# Patient Record
Sex: MALE | Race: WHITE | NOT HISPANIC OR LATINO | Employment: FULL TIME | URBAN - METROPOLITAN AREA
[De-identification: names, ages, dates, MRNs, and addresses within clinical notes are randomized per-mention and may not be internally consistent; named-entity substitution may affect disease eponyms.]

---

## 2017-01-18 ENCOUNTER — GENERIC CONVERSION - ENCOUNTER (OUTPATIENT)
Dept: OTHER | Facility: OTHER | Age: 47
End: 2017-01-18

## 2017-02-05 ENCOUNTER — APPOINTMENT (EMERGENCY)
Dept: RADIOLOGY | Facility: HOSPITAL | Age: 47
End: 2017-02-05
Payer: COMMERCIAL

## 2017-02-05 ENCOUNTER — HOSPITAL ENCOUNTER (EMERGENCY)
Facility: HOSPITAL | Age: 47
Discharge: HOME | End: 2017-02-05
Admitting: EMERGENCY MEDICINE
Payer: COMMERCIAL

## 2017-02-05 VITALS
RESPIRATION RATE: 16 BRPM | DIASTOLIC BLOOD PRESSURE: 96 MMHG | HEIGHT: 68 IN | OXYGEN SATURATION: 100 % | WEIGHT: 210 LBS | HEART RATE: 66 BPM | TEMPERATURE: 97.6 F | BODY MASS INDEX: 31.83 KG/M2 | SYSTOLIC BLOOD PRESSURE: 186 MMHG

## 2017-02-05 DIAGNOSIS — M79.604 PAIN OF RIGHT LOWER EXTREMITY: Primary | ICD-10-CM

## 2017-02-05 PROCEDURE — 73560 X-RAY EXAM OF KNEE 1 OR 2: CPT

## 2017-02-05 PROCEDURE — 99284 EMERGENCY DEPT VISIT MOD MDM: CPT

## 2017-02-05 PROCEDURE — 73590 X-RAY EXAM OF LOWER LEG: CPT

## 2017-03-29 ENCOUNTER — APPOINTMENT (OUTPATIENT)
Dept: LAB | Facility: HOSPITAL | Age: 47
End: 2017-03-29
Payer: COMMERCIAL

## 2017-03-29 ENCOUNTER — ALLSCRIPTS OFFICE VISIT (OUTPATIENT)
Dept: OTHER | Facility: OTHER | Age: 47
End: 2017-03-29

## 2017-03-29 DIAGNOSIS — R35.0 FREQUENCY OF MICTURITION: ICD-10-CM

## 2017-03-29 LAB
BILIRUB UR QL STRIP: NORMAL
GLUCOSE (HISTORICAL): NORMAL
HGB UR QL STRIP.AUTO: NORMAL
KETONES UR STRIP-MCNC: NORMAL MG/DL
LEUKOCYTE ESTERASE UR QL STRIP: NORMAL
NITRITE UR QL STRIP: NORMAL
PH UR STRIP.AUTO: 6.5 [PH]
PROT UR STRIP-MCNC: NORMAL MG/DL
SP GR UR STRIP.AUTO: 1.02
UROBILINOGEN UR QL STRIP.AUTO: 1

## 2017-03-29 PROCEDURE — 87086 URINE CULTURE/COLONY COUNT: CPT

## 2017-03-30 ENCOUNTER — GENERIC CONVERSION - ENCOUNTER (OUTPATIENT)
Dept: OTHER | Facility: OTHER | Age: 47
End: 2017-03-30

## 2017-03-30 LAB — BACTERIA UR CULT: NORMAL

## 2018-01-12 VITALS
TEMPERATURE: 97.8 F | WEIGHT: 215.2 LBS | DIASTOLIC BLOOD PRESSURE: 82 MMHG | OXYGEN SATURATION: 99 % | BODY MASS INDEX: 31.87 KG/M2 | HEART RATE: 66 BPM | SYSTOLIC BLOOD PRESSURE: 126 MMHG | HEIGHT: 69 IN

## 2018-01-16 NOTE — RESULT NOTES
Message   Urine culture not showing infection  Are his symptoms improving?  If not would refer to Urology     Verified Results  (1) URINE CULTURE [id number] 06:19PM Elza Rom Order Number: [id number]     Test Name Result Flag Reference   CLINICAL REPORT (Report)     Test:        Urine culture  Specimen Type:   Urine  Specimen Date:   3/29/2017 6:19 PM  Result Date:    3/30/2017 5:18 PM  Result Status:   Final result  Resulting Lab:   [facility] [address] [place] [address]            Tel: [phone]      CULTURE                                       ------------------                                   [id number] cfu/ml Mixed Contaminants X2

## 2018-01-16 NOTE — RESULT NOTES
Message   He has severe osteoarthritis of the hip  Other Xrays are normal  Should see ortho regarding his hip     Verified Results  * XR HIP/PELV 2-3 VWS LEFT W PELVIS IF PERFORMED 21Nov2016 04:16PM Tatyana Meir Order Number: [id number]     Test Name Result Flag Reference   * XR HIP/PELV 2-3 VWS LEFT (Report)     LEFT HIP     INDICATION: Left hip pain  COMPARISON: None     VIEWS: AP pelvis and 2 coned down views; 3 images     FINDINGS:     There is no fracture or dislocation  There is severe degenerative narrowing superiorly in the left hip joint, with associated increased subchondral sclerosis and prominent marginal osteophytes  Mild degenerative changes are noted in the right hip  No lytic or blastic lesions are seen  Soft tissues are unremarkable  IMPRESSION:     Severe left hip osteoarthritis  Workstation performed: [id number]     Signed by:   Vicky Mcneil MD   11/23/16     * XR FINGER LEFT FIFTH DIGIT-PINKIE [id number] 04:16PM Tatyana Meir Order Number: [id number]     Test Name Result Flag Reference   XR FINGER LEFT FIFTH DIGIT-PINKIE (Report)     LEFT FINGER     INDICATION: Left 5th finger pain  COMPARISON: None     VIEWS: PA view hand and 2 cone-down views of the 5th digit; 3 images     For the purposes of institution wide universal language the following terms will apply: (thumb=1st digit/finger, index finger=2nd digit/finger, long finger=3rd digit/finger, ring=4th digit/finger and small finger=5th digit/finger)     FINDINGS:     There is no acute fracture or dislocation  No significant degenerative changes  No lytic or blastic lesion  Soft tissues are unremarkable  IMPRESSION:     No fracture         Workstation performed: [id number]     Signed by:   Vicky Mcneil MD   11/23/16     * XR WRIST 3+ VIEW LEFT [id number] 04:16PM Tatayna Meir Order Number: [id number]     Test Name Result Flag Reference   XR WRIST 3+ VW LEFT (Report) LEFT WRIST     INDICATION: Left wrist pain  COMPARISON: None     VIEWS: 3; 3 images     FINDINGS:     There is no acute fracture or dislocation  No degenerative changes  No lytic or blastic lesions are seen  Soft tissues are unremarkable  IMPRESSION:     No acute osseous abnormality         Workstation performed: [id number]     Signed by:   Masha Gordon MD   11/23/16

## 2018-01-17 NOTE — MISCELLANEOUS
Message   Recorded as Task   Date: 01/16/2017 01:36 PM, Created By: Florence Sanchez   Task Name: Call Back   Assigned To: Alejandra Lim   Regarding Patient: Thanh Saez, Status: In Progress   Comment:    Alejandra Lim - 16 Jan 2017 1:36 PM     TASK CREATED  Please call find out who did he see for Ortho ? Just checking on him  I have not gotten any reports  Remy Lewis - 16 Jan 2017 2:48 PM     TASK REPLIED TO: Previously Assigned To [facility],Team  lmomtcRemy Granda - 16 Jan 2017 2:48 PM     TASK IN PROGRESS   Remy Lewis - 17 Jan 2017 11:33 AM     TASK REPLIED TO: Previously Assigned To [facility],Team  [email or web address]   Cristal Larsen - 18 Jan 2017 9:58 AM     TASK REPLIED TO: Previously Assigned To Odalys Childs                      spoke with pt he said he went to [place] on [address] not sure where or who he stated it was not a good day he got very mad with them and left he said he didn't understand what they were talking about  He said he would like for you to give him a call  Alejandra Lim - 18 Jan 2017 9:59 AM     TASK REPLIED TO: Previously Assigned To Alejandra Lim  ok can we try to get a report first? Maybe [facility]? Cristal Larsen - 18 Jan 2017 11:00 AM     TASK REPLIED TO: Previously Assigned To Odalys Childs                      spoke with pt and he was not very nice he told me he didn't want to go over it with me he SAID HE WAS NOT SURE WHERE HE WENT  Lisa Ulrich - 18 Jan 2017 11:10 AM     TASK REPLIED TO: Previously Assigned To Alejandra Lim                      called [facility] and she said he saw Dr Alexandra Nieto on 12/7/16 but when she went to look at the notes there was nothinging in there  she said she is going to put in a message why and will call us or fax us the info  Also see my previous note  Spoke to patient - he states he did see ortho & he was told to just modify his activities and he has done this   He has done less high impact activities and his pain is actually much better  He is not having much pain in the hip or leg  He does not take any medications  I advised him to call me back if pain returns        Signatures   Electronically signed by : Paulino Robles MD; Jan 18 2017 12:51PM EST                       (Author)

## 2018-01-23 NOTE — PROGRESS NOTES
Assessment    1  Left wrist pain (719 43) (M25 532)   2  Pain of finger of left hand (729 5) (M94 312)   3  Hip pain, left (719 45) (M25 372)    Plan  Health Maintenance    · Stop: Fluzone Quadrivalent 0 5 ML Intramuscular Suspension Prefilled  Syringe  Hip pain, left    · * XR HIP/PELV 2-3 VWS LEFT W PELVIS IF PERFORMED; Status:Active; Requested  for:16Nov2016;   Left wrist pain    · * XR WRIST 3+ VIEW LEFT; Status:Active; Requested for:16Nov2016;   Pain of finger of left hand    · * XR FINGER LEFT FIFTH DIGIT-PINKIE; Status:Active; Requested for:16Nov2016;     Discussion/Summary    Will Xray wrist, finger, and hip  Advised Aleve which he will get OTC  May need ortho  Possible side effects of new medications were reviewed with the patient/guardian today  The treatment plan was reviewed with the patient/guardian  The patient/guardian understands and agrees with the treatment plan      Chief Complaint  Patient is here for left pinky pain, left wrist pain and left thigh pain  History of Present Illness  48year old here for L 5th digit pain, also developed a lump around the knuckle  Pain started about 2 months ago  No current treatment  He is very active in martial arts and gripping makes it worse  He also has L wrist pain for about a week - radial deviation causes pain  No current treatment  He also complains of pain on the L upper leg for the past year  Feels heavy at times  He describes the pain as achy  He has pain with walking and he seems to be limping  No numbness or tingling  Review of Systems    Constitutional: No fever or chills, feels well, no tiredness, no recent weight gain or weight loss  Cardiovascular: No complaints of slow heart rate, no fast heart rate, no chest pain, no palpitations, no leg claudication, no lower extremity  Respiratory: No complaints of shortness of breath, no wheezing, no cough, no SOB on exertion, no orthopnea or PND  Musculoskeletal: as noted in HPI  Integumentary: No complaints of skin rash or skin lesions, no itching, no skin wound, no dry skin  Neurological: No compliants of headache, no confusion, no convulsions, no numbness or tingling, no dizziness or fainting, no limb weakness, no difficulty walking  Active Problems    1  Lipid screening (V77 91) (Z13 220)   2  Screening PSA (prostate specific antigen) (V76 44) (Z12 5)    Past Medical History    1  Negative depression screening    The active problems and past medical history were reviewed and updated today  Family History  Mother    1  Family history of malignant neoplasm of breast (V16 3) (Z80 3)  Father    2  Family history of Chronic atrial fibrillation    The family history was reviewed and updated today  Social History    · Never a smoker  The social history was reviewed and updated today  Current Meds   1  No Reported Medications Recorded    The medication list was reviewed and updated today  Allergies    1  Ampicillin CAPS    Vitals  Vital Signs    Recorded: 25KFK5413 79:12RY   Systolic 145   Diastolic 82   Heart Rate 76   Temperature 97 4 F   O2 Saturation 97   Height 5 ft 9 in   Weight 223 lb 12 8 oz   BMI Calculated 33 05   BSA Calculated 2 16     Physical Exam    Constitutional   General appearance: No acute distress, well appearing and well nourished  Pulmonary   Respiratory effort: No increased work of breathing or signs of respiratory distress  Musculoskeletal   Gait and station: Normal       Left Hip: Tenderness: None  Flexion: painful restricted AROM  External rotation: painful restricted AROM  Abduction: painful restricted AROM  Special Tests: positive GUILLERMO test    Left Fifth Digit/Hand: Appearance: swelling of the 5th DIP joint  Tenderness: 5th DIP joint  Left Wrist: Appearance: Normal  Tenderness: radial styloid  ROM: Full        Results/Data  PHQ-9 Adult Depression Screening 88IWT5885 11:13AM User, Ahs     Test Name Result Flag Reference   PHQ-9 Adult Depression Score 5     Over the last two weeks, how often have you been bothered by any of the following problems? Little interest or pleasure in doing things: Not at all - 0  Feeling down, depressed, or hopeless: Not at all - 0  Trouble falling or staying asleep, or sleeping too much: Nearly every day - 3  Feeling tired or having little energy: More than half the days - 2  Poor appetite or over eating: Not at all - 0  Feeling bad about yourself - or that you are a failure or have let yourself or your family down: Not at all - 0  Trouble concentrating on things, such as reading the newspaper or watching television: Not at all - 0  Moving or speaking so slowly that other people could have noticed   Or the opposite -  being so fidgety or restless that you have been moving around a lot more than usual: Not at all - 0  Thoughts that you would be better off dead, or of hurting yourself in some way: Not at all - 0   PHQ-9 Adult Depression Screening Negative     PHQ-9 Difficulty Level Not difficult at all     PHQ-9 Severity Mild Depression         Signatures   Electronically signed by : Catarino Snell MD; Nov 16 2016 12:12PM EST                       (Author)

## 2018-02-08 ENCOUNTER — OFFICE VISIT (OUTPATIENT)
Dept: FAMILY MEDICINE CLINIC | Facility: CLINIC | Age: 48
End: 2018-02-08
Payer: COMMERCIAL

## 2018-02-08 VITALS
TEMPERATURE: 97.6 F | WEIGHT: 190 LBS | HEART RATE: 65 BPM | SYSTOLIC BLOOD PRESSURE: 120 MMHG | RESPIRATION RATE: 18 BRPM | BODY MASS INDEX: 28.14 KG/M2 | OXYGEN SATURATION: 98 % | HEIGHT: 69 IN | DIASTOLIC BLOOD PRESSURE: 76 MMHG

## 2018-02-08 DIAGNOSIS — Z12.5 PROSTATE CANCER SCREENING: ICD-10-CM

## 2018-02-08 DIAGNOSIS — R53.83 FATIGUE, UNSPECIFIED TYPE: ICD-10-CM

## 2018-02-08 DIAGNOSIS — Z13.220 LIPID SCREENING: ICD-10-CM

## 2018-02-08 DIAGNOSIS — R39.9 UTI SYMPTOMS: Primary | ICD-10-CM

## 2018-02-08 DIAGNOSIS — R33.9 URINARY RETENTION: ICD-10-CM

## 2018-02-08 LAB
SL AMB  POCT GLUCOSE, UA: NEGATIVE
SL AMB LEUKOCYTE ESTERASE,UA: NEGATIVE
SL AMB POCT BILIRUBIN,UA: NEGATIVE
SL AMB POCT BLOOD,UA: NEGATIVE
SL AMB POCT CLARITY,UA: CLEAR
SL AMB POCT COLOR,UA: YELLOW
SL AMB POCT KETONES,UA: NEGATIVE
SL AMB POCT NITRITE,UA: NEGATIVE
SL AMB POCT PH,UA: 5.5
SL AMB POCT SPECIFIC GRAVITY,UA: 1.01

## 2018-02-08 PROCEDURE — 99214 OFFICE O/P EST MOD 30 MIN: CPT | Performed by: NURSE PRACTITIONER

## 2018-02-08 PROCEDURE — 81002 URINALYSIS NONAUTO W/O SCOPE: CPT | Performed by: NURSE PRACTITIONER

## 2018-02-08 NOTE — PROGRESS NOTES
Assessment/Plan:    No problem-specific Assessment & Plan notes found for this encounter  Diagnoses and all orders for this visit:    UTI symptoms  -     POCT urine dip  -     Comprehensive metabolic panel; Future  -     PSA Total, Diagnostic; Future  -     Testosterone, free, total; Future  -     Lipid Panel with Direct LDL reflex; Future  -     Vitamin D 25 hydroxy; Future    Urinary retention  -     Comprehensive metabolic panel; Future  -     PSA Total, Diagnostic; Future  -     Testosterone, free, total; Future  -     Lipid Panel with Direct LDL reflex; Future  -     Vitamin D 25 hydroxy; Future    Fatigue, unspecified type  -     Comprehensive metabolic panel; Future  -     PSA Total, Diagnostic; Future  -     Testosterone, free, total; Future  -     Lipid Panel with Direct LDL reflex; Future  -     Vitamin D 25 hydroxy; Future    Lipid screening  -     Comprehensive metabolic panel; Future  -     PSA Total, Diagnostic; Future  -     Testosterone, free, total; Future  -     Lipid Panel with Direct LDL reflex; Future  -     Vitamin D 25 hydroxy; Future    Prostate cancer screening  -     Comprehensive metabolic panel; Future  -     PSA Total, Diagnostic; Future  -     Testosterone, free, total; Future  -     Lipid Panel with Direct LDL reflex; Future  -     Vitamin D 25 hydroxy; Future          Vitals:    02/08/18 1316   BP: 120/76   Pulse: 65   Resp: 18   Temp: 97 6 °F (36 4 °C)   SpO2: 98%       Subjective:      Patient ID: Silva Martino is a 50 y o  male  Pt started with symptoms of urinary burning a few weeks ago  Symptoms improved but then got worse  He feels like he is not emptying  Feels some leaking  No blood   No fever or back pain  No pressure on lower abdomen  Started supplement of Creatine a few weeks ago  Pt would also like routine labs and screening tests done           The following portions of the patient's history were reviewed and updated as appropriate:   He  has no past medical history on file  He  does not have any pertinent problems on file  He  has a past surgical history that includes Tonsillectomy  His family history includes Atrial fibrillation in his father; Breast cancer in his mother; Stomach cancer in his paternal uncle  He  reports that he has never smoked  He has never used smokeless tobacco  He reports that he drinks alcohol  He reports that he does not use drugs  No current outpatient prescriptions on file  No current facility-administered medications for this visit  No current outpatient prescriptions on file prior to visit  No current facility-administered medications on file prior to visit  He is allergic to ampicillin       Review of Systems   Constitutional: Negative for fatigue and fever  HENT: Negative  Respiratory: Negative  Gastrointestinal: Negative for abdominal pain  Endocrine: Negative for cold intolerance and heat intolerance  Genitourinary: Positive for difficulty urinating, frequency and urgency  Negative for decreased urine volume, flank pain and hematuria  Musculoskeletal: Negative for back pain  Skin: Negative  Allergic/Immunologic: Negative for immunocompromised state  Neurological: Negative for dizziness  Hematological: Negative for adenopathy  All other systems reviewed and are negative  Objective:     Physical Exam   Constitutional: He is oriented to person, place, and time  He appears well-developed and well-nourished  No distress  HENT:   Head: Normocephalic and atraumatic  Eyes: Conjunctivae and EOM are normal  Pupils are equal, round, and reactive to light  Neck: Normal range of motion  Neck supple  Cardiovascular: Normal rate, regular rhythm and normal heart sounds  Pulmonary/Chest: Effort normal and breath sounds normal  No respiratory distress  Abdominal: Soft  He exhibits no distension     Genitourinary:   Genitourinary Comments: Denies CVA tenderness   Musculoskeletal: Normal range of motion  Neurological: He is alert and oriented to person, place, and time  Skin: Skin is warm and dry  Psychiatric: He has a normal mood and affect  His behavior is normal  Judgment and thought content normal    Nursing note and vitals reviewed

## 2018-02-08 NOTE — PATIENT INSTRUCTIONS
Urine testing is completely within normal limits  If no imprvment of symptoms with interventions that have been discussed, you would need an ultrasound of your kidneys    Urinary symptoms- stop Creatine supplement  Increase water consumption to at least 1 liter a day  Check routine labs-screen for cholesterol, glucose , and kidney fxn  Fatigue- check testosterone, Vitamin d levels    We will call you with lab results

## 2018-02-12 DIAGNOSIS — E55.9 VITAMIN D DEFICIENCY: Primary | ICD-10-CM

## 2018-02-12 RX ORDER — ERGOCALCIFEROL 1.25 MG/1
50000 CAPSULE ORAL WEEKLY
Qty: 4 CAPSULE | Refills: 3 | Status: SHIPPED | OUTPATIENT
Start: 2018-02-12 | End: 2018-04-18

## 2018-02-13 LAB
25(OH)D3+25(OH)D2 SERPL-MCNC: 18.6 NG/ML (ref 30–100)
ALBUMIN SERPL-MCNC: 4.4 G/DL (ref 3.5–5.5)
ALBUMIN/GLOB SERPL: 1.8 {RATIO} (ref 1.2–2.2)
ALP SERPL-CCNC: 47 IU/L (ref 39–117)
ALT SERPL-CCNC: 20 IU/L (ref 0–44)
AST SERPL-CCNC: 18 IU/L (ref 0–40)
BILIRUB SERPL-MCNC: 0.5 MG/DL (ref 0–1.2)
BUN SERPL-MCNC: 18 MG/DL (ref 6–24)
BUN/CREAT SERPL: 17 (ref 9–20)
CALCIUM SERPL-MCNC: 8.8 MG/DL (ref 8.7–10.2)
CHLORIDE SERPL-SCNC: 102 MMOL/L (ref 96–106)
CHOLEST SERPL-MCNC: 158 MG/DL (ref 100–199)
CO2 SERPL-SCNC: 25 MMOL/L (ref 18–29)
CREAT SERPL-MCNC: 1.09 MG/DL (ref 0.76–1.27)
GLOBULIN SER-MCNC: 2.4 G/DL (ref 1.5–4.5)
GLUCOSE SERPL-MCNC: 91 MG/DL (ref 65–99)
HDLC SERPL-MCNC: 54 MG/DL
LDLC SERPL CALC-MCNC: 86 MG/DL (ref 0–99)
LDLC SERPL DIRECT ASSAY-MCNC: 93 MG/DL (ref 0–99)
POTASSIUM SERPL-SCNC: 4.3 MMOL/L (ref 3.5–5.2)
PROT SERPL-MCNC: 6.8 G/DL (ref 6–8.5)
PSA SERPL-MCNC: 0.4 NG/ML (ref 0–4)
SL AMB EGFR AFRICAN AMERICAN: 92 ML/MIN/1.73
SL AMB EGFR NON AFRICAN AMERICAN: 79 ML/MIN/1.73
SL AMB VLDL CHOLESTEROL CALC: 18 MG/DL (ref 5–40)
SODIUM SERPL-SCNC: 141 MMOL/L (ref 134–144)
TESTOST FREE SERPL-MCNC: 9.6 PG/ML (ref 6.8–21.5)
TESTOST SERPL-MCNC: 421 NG/DL (ref 264–916)
TRIGL SERPL-MCNC: 89 MG/DL (ref 0–149)

## 2018-04-18 ENCOUNTER — OFFICE VISIT (OUTPATIENT)
Dept: FAMILY MEDICINE CLINIC | Facility: CLINIC | Age: 48
End: 2018-04-18
Payer: COMMERCIAL

## 2018-04-18 VITALS
HEIGHT: 69 IN | HEART RATE: 60 BPM | BODY MASS INDEX: 29.92 KG/M2 | TEMPERATURE: 97.2 F | DIASTOLIC BLOOD PRESSURE: 74 MMHG | SYSTOLIC BLOOD PRESSURE: 116 MMHG | OXYGEN SATURATION: 98 % | WEIGHT: 202 LBS

## 2018-04-18 DIAGNOSIS — R07.81 RIB PAIN ON RIGHT SIDE: ICD-10-CM

## 2018-04-18 DIAGNOSIS — S39.011A FLANK STRAIN, INITIAL ENCOUNTER: Primary | ICD-10-CM

## 2018-04-18 PROBLEM — R39.9 UTI SYMPTOMS: Status: RESOLVED | Noted: 2018-02-08 | Resolved: 2018-04-18

## 2018-04-18 PROBLEM — R33.9 URINARY RETENTION: Status: RESOLVED | Noted: 2018-02-08 | Resolved: 2018-04-18

## 2018-04-18 PROCEDURE — 1036F TOBACCO NON-USER: CPT | Performed by: FAMILY MEDICINE

## 2018-04-18 PROCEDURE — 99213 OFFICE O/P EST LOW 20 MIN: CPT | Performed by: FAMILY MEDICINE

## 2018-04-18 PROCEDURE — 3008F BODY MASS INDEX DOCD: CPT | Performed by: FAMILY MEDICINE

## 2018-04-18 NOTE — LETTER
April 18, 2018     Patient: Kb Gold   YOB: 1970   Date of Visit: 4/18/2018       To Whom it May Concern:    Tatiana Mock is under my professional care  He was seen in my office on 4/18/2018  He has a strain of the right flank and lower rib cage  Participation in physical activity is at his discretion  If you have any questions or concerns, please don't hesitate to call           Sincerely,          Berenice Benitez MD        CC: No Recipients

## 2018-04-18 NOTE — PROGRESS NOTES
Assessment/Plan:     Diagnoses and all orders for this visit:    Flank strain, initial encounter    Rib pain on right side      Advised NSAIDs, heat, rest- he has Aleve at home  Call if not improving over next 4-6 wks    Subjective:      Patient ID: Parish Long is a 50 y o  male  Here for R sided flank pain started 4 days ago  He has been doing abdominal work outs  He is very active in martial arts  Bending, straining, coughing, sneezing, makes it worse  No known injury  No fever, nausea, vomiting  The following portions of the patient's history were reviewed and updated as appropriate:   He  has no past medical history on file  He   Patient Active Problem List    Diagnosis Date Noted    Flank strain, initial encounter 04/18/2018    Rib pain on right side 04/18/2018    Vitamin D deficiency 02/12/2018    Fatigue 02/08/2018    Lipid screening 02/08/2018    Osteoarthritis of left hip 11/23/2016     He  has a past surgical history that includes Tonsillectomy  His family history includes Atrial fibrillation in his father; Breast cancer in his mother; Stomach cancer in his paternal uncle  He  reports that he has never smoked  He has never used smokeless tobacco  He reports that he drinks alcohol  He reports that he does not use drugs  No current outpatient prescriptions on file  No current facility-administered medications for this visit  Current Outpatient Prescriptions on File Prior to Visit   Medication Sig    [DISCONTINUED] ergocalciferol (VITAMIN D2) 50,000 units Take 1 capsule (50,000 Units total) by mouth once a week     No current facility-administered medications on file prior to visit  He is allergic to ampicillin       Review of Systems   Constitutional: Negative for fever  Gastrointestinal: Negative for abdominal pain, blood in stool, constipation, diarrhea, nausea and vomiting     Musculoskeletal:        R flank pain   All other systems reviewed and are negative  Objective:      /74   Pulse 60   Temp (!) 97 2 °F (36 2 °C)   Ht 5' 9" (1 753 m)   Wt 91 6 kg (202 lb)   SpO2 98%   BMI 29 83 kg/m²          Physical Exam   Abdominal: Soft  He exhibits no distension and no mass  There is no tenderness  There is no rebound and no guarding  Musculoskeletal:        Arms:  Nursing note and vitals reviewed

## 2018-12-18 ENCOUNTER — OFFICE VISIT (OUTPATIENT)
Dept: FAMILY MEDICINE CLINIC | Facility: CLINIC | Age: 48
End: 2018-12-18
Payer: COMMERCIAL

## 2018-12-18 VITALS
HEIGHT: 69 IN | BODY MASS INDEX: 30.07 KG/M2 | WEIGHT: 203 LBS | SYSTOLIC BLOOD PRESSURE: 122 MMHG | HEART RATE: 63 BPM | TEMPERATURE: 97.6 F | DIASTOLIC BLOOD PRESSURE: 82 MMHG | OXYGEN SATURATION: 98 %

## 2018-12-18 DIAGNOSIS — Z13.220 LIPID SCREENING: ICD-10-CM

## 2018-12-18 DIAGNOSIS — J06.9 VIRAL URI: Primary | ICD-10-CM

## 2018-12-18 DIAGNOSIS — R53.83 FATIGUE, UNSPECIFIED TYPE: ICD-10-CM

## 2018-12-18 DIAGNOSIS — E55.9 VITAMIN D DEFICIENCY: ICD-10-CM

## 2018-12-18 DIAGNOSIS — H81.10 BENIGN PAROXYSMAL POSITIONAL VERTIGO, UNSPECIFIED LATERALITY: ICD-10-CM

## 2018-12-18 PROBLEM — R07.81 RIB PAIN ON RIGHT SIDE: Status: RESOLVED | Noted: 2018-04-18 | Resolved: 2018-12-18

## 2018-12-18 PROBLEM — S39.011A: Status: RESOLVED | Noted: 2018-04-18 | Resolved: 2018-12-18

## 2018-12-18 PROCEDURE — 99213 OFFICE O/P EST LOW 20 MIN: CPT | Performed by: FAMILY MEDICINE

## 2018-12-18 PROCEDURE — 3008F BODY MASS INDEX DOCD: CPT | Performed by: FAMILY MEDICINE

## 2018-12-18 PROCEDURE — 1036F TOBACCO NON-USER: CPT | Performed by: FAMILY MEDICINE

## 2018-12-18 NOTE — PROGRESS NOTES
Corry Smalls 1970 male MRN: [id number]    Acute Visit        ASSESSMENT/PLAN  Diagnoses and all orders for this visit:    Viral URI    Benign paroxysmal positional vertigo, unspecified laterality    Vitamin D deficiency  -     Vitamin D 25 hydroxy; Future    Lipid screening  -     Lipid Panel with Direct LDL reflex; Future    Fatigue, unspecified type  -     CBC and differential; Future  -     Comprehensive metabolic panel; Future          This is a viral upper respiratory infection  The expected course is 7-10 days  We reviewed OTC medications that are recommended to treat the symptoms  Tylenol or Motrin can be used as needed for pain or fevers  For Vertigo, declined Rx for meclizine  Discussed typically BPPV is self limited  He can try OTC dramamine  FU if not better w/in a week    Update labs  No future appointments  SUBJECTIVE  CC: Dizziness (Patient seen in office today for a c/o feeling dizzy and nauseous - when he turns his head side to side he gets dizzy)       He has congestion, dry throat for few days and today woke up dizzy - felt room spinning and had nausea  Taking Vitamin C  His Vit D was low last year  He is not taking Vit D anymore, was on it but stopped  He would like to get his labs updated  He does report fatigue      Fiona Rosario is a 46 y o  male who presented for an acute visit complaining of  Review of Systems   Constitutional: Positive for fatigue  Negative for activity change, appetite change, chills and fever  HENT: Positive for congestion and sore throat  Negative for ear pain  Eyes: Negative  Respiratory: Positive for cough  Gastrointestinal: Negative  Neurological: Positive for dizziness  Historical Information   The patient history was reviewed as follows:  No past medical history on file    Past Surgical History:   Procedure Laterality Date    TONSILLECTOMY       Family History   Problem Relation Age of Onset   Clifm Alex Breast cancer Mother  Atrial fibrillation Father     Stomach cancer Paternal Uncle       Social History   History   Alcohol Use    Yes     Comment: rare, social       History   Drug Use No     History   Smoking Status    Never Smoker   Smokeless Tobacco    Never Used       Medications:   Meds/Allergies   No current outpatient prescriptions on file  No current facility-administered medications for this visit  Allergies   Allergen Reactions    Ampicillin      nausea       OBJECTIVE  Vitals:   Vitals:    12/18/18 1353   BP: 122/82   Pulse: 63   Temp: 97 6 °F (36 4 °C)   SpO2: 98%   Weight: 92 1 kg (203 lb)   Height: 5' 9" (1 753 m)       Invasive Devices          No matching active lines, drains, or airways          Physical Exam   Constitutional: He is oriented to person, place, and time  He appears well-developed and well-nourished  No distress  HENT:   Right Ear: Hearing, tympanic membrane, external ear and ear canal normal    Left Ear: Hearing, tympanic membrane, external ear and ear canal normal    Nose: Nose normal    Mouth/Throat: Uvula is midline  No oropharyngeal exudate or posterior oropharyngeal erythema  Eyes: Pupils are equal, round, and reactive to light  Conjunctivae are normal    Cardiovascular: Normal rate and normal heart sounds  Exam reveals no gallop and no friction rub  No murmur heard  Pulmonary/Chest: Effort normal and breath sounds normal  No respiratory distress  He has no wheezes  He has no rales  Lymphadenopathy:     He has no cervical adenopathy  Neurological: He is alert and oriented to person, place, and time  Skin: Skin is warm  No rash noted  Psychiatric: He has a normal mood and affect  His behavior is normal  Judgment and thought content normal    Nursing note and vitals reviewed  Lab:  I have personally reviewed all pertinent results

## 2019-05-14 NOTE — PATIENT INSTRUCTIONS
Benign Paroxysmal Positional Vertigo   AMBULATORY CARE:   Benign paroxysmal positional vertigo (BPPV)  is an inner ear condition that causes you to suddenly feel dizzy  Benign means it is not serious or life-threatening  BPPV is caused by a problem with the nerves and structure of your inner ear  BPPV happens when small pieces of calcium break loose and lump together in one of your inner ear canals  Common symptoms include the following: You may feel that you or the room is moving or spinning  Turning your head, rolling over in bed, getting up or lying down may lead to sudden vertigo  You may also have any of the following symptoms:  · Nystagmus (quick shaky eye movement that you cannot control)    · Nausea    · Poor balance and feeling unsteady when you walk  Seek care immediately if:   · You fall during a BPPV episode and are injured  · You have a severe headache that does not go away  · You have new changes in your vision or feel weak or confused  · You have problems hearing, or you have ringing or buzzing in your ears  Contact your healthcare provider if:   · Your BPPV symptoms do not go away or they return  · You have problems with your balance, or you are falling often  · You have new or increased nausea or vomiting with vertigo  · You feel anxious or depressed and do not want to leave your home  · You have questions or concerns about your condition or care  Management of BPPV:   · Your healthcare provider will teach you how to move your head and body to prevent symptoms  For example, he or she may teach you certain ways to move your head or body  These movements usually help relieve your symptoms and keep the dizziness from returning  The exercises help move the calcium pieces to a different part of your ear  Do the movements only as directed  · Vestibular and balance rehabilitation therapy (VBRT)  is used to teach you exercises to improve your balance and strength   VBRT Staff Handoff  Bedside report per SILVESTRE Galvan. No distress noted. Room air. Awake, alert, oriented. Sitter at bedside. In chair, wheels locked. Call bell in reach. Encouraged to call for assistance.     Resident Handoff     may help decrease your dizziness and prevent injuries if you are at risk for falls  · Medicines  may be recommended or prescribed to treat dizziness or nausea  Prevent your symptoms:   · Try to avoid sudden head movements  Stand up and lie down slowly  · Raise and support your head when you lie down  Place pillows under your upper back and head or rest in a recliner  · Change your position often when you are lying down  Try not to lie with your head on the same side for long periods of time  Roll over slowly  · Wear protective gear  when you ride a bike or play sports  A helmet helps protect your head from injury  Follow up with your healthcare provider as directed: You may need to return in 1 month to check the progress of your treatment  Write down your questions so you remember to ask them during your visits  © 2017 [employer] Information is for End User's use only and may not be sold, redistributed or otherwise used for commercial purposes  All illustrations and images included in CareNotes® are the copyrighted property of [employer]  or David Amezcua  The above information is an  only  It is not intended as medical advice for individual conditions or treatments  Talk to your doctor, nurse or pharmacist before following any medical regimen to see if it is safe and effective for you

## 2019-06-05 ENCOUNTER — OFFICE VISIT (OUTPATIENT)
Dept: FAMILY MEDICINE CLINIC | Facility: CLINIC | Age: 49
End: 2019-06-05
Payer: COMMERCIAL

## 2019-06-05 ENCOUNTER — APPOINTMENT (OUTPATIENT)
Dept: RADIOLOGY | Facility: CLINIC | Age: 49
End: 2019-06-05
Payer: COMMERCIAL

## 2019-06-05 VITALS
DIASTOLIC BLOOD PRESSURE: 76 MMHG | TEMPERATURE: 97.6 F | BODY MASS INDEX: 30.48 KG/M2 | OXYGEN SATURATION: 98 % | SYSTOLIC BLOOD PRESSURE: 130 MMHG | HEIGHT: 69 IN | RESPIRATION RATE: 18 BRPM | WEIGHT: 205.8 LBS | HEART RATE: 60 BPM

## 2019-06-05 DIAGNOSIS — M25.50 ARTHRALGIA, UNSPECIFIED JOINT: ICD-10-CM

## 2019-06-05 DIAGNOSIS — R20.2 NUMBNESS AND TINGLING OF RIGHT ARM: ICD-10-CM

## 2019-06-05 DIAGNOSIS — R20.0 NUMBNESS AND TINGLING OF RIGHT ARM: ICD-10-CM

## 2019-06-05 DIAGNOSIS — M16.12 PRIMARY OSTEOARTHRITIS OF LEFT HIP: ICD-10-CM

## 2019-06-05 DIAGNOSIS — Z13.220 LIPID SCREENING: ICD-10-CM

## 2019-06-05 DIAGNOSIS — M25.532 LEFT WRIST PAIN: ICD-10-CM

## 2019-06-05 DIAGNOSIS — Z12.11 COLON CANCER SCREENING: ICD-10-CM

## 2019-06-05 DIAGNOSIS — Z12.5 PROSTATE CANCER SCREENING: ICD-10-CM

## 2019-06-05 DIAGNOSIS — E55.9 VITAMIN D DEFICIENCY: ICD-10-CM

## 2019-06-05 DIAGNOSIS — Z13.1 SCREENING FOR DIABETES MELLITUS: ICD-10-CM

## 2019-06-05 DIAGNOSIS — R53.83 FATIGUE, UNSPECIFIED TYPE: Primary | ICD-10-CM

## 2019-06-05 PROCEDURE — 73502 X-RAY EXAM HIP UNI 2-3 VIEWS: CPT

## 2019-06-05 PROCEDURE — 99214 OFFICE O/P EST MOD 30 MIN: CPT | Performed by: FAMILY MEDICINE

## 2019-06-05 PROCEDURE — 73110 X-RAY EXAM OF WRIST: CPT

## 2019-06-05 PROCEDURE — 3008F BODY MASS INDEX DOCD: CPT | Performed by: FAMILY MEDICINE

## 2019-06-05 RX ORDER — IBUPROFEN 800 MG/1
800 TABLET ORAL
Qty: 30 TABLET | Refills: 1 | Status: SHIPPED | OUTPATIENT
Start: 2019-06-05 | End: 2021-02-04

## 2019-06-24 ENCOUNTER — TELEPHONE (OUTPATIENT)
Dept: GASTROENTEROLOGY | Facility: CLINIC | Age: 49
End: 2019-06-24

## 2021-01-31 DIAGNOSIS — Z20.822 EXPOSURE TO COVID-19 VIRUS: Primary | ICD-10-CM

## 2021-01-31 DIAGNOSIS — R09.81 CONGESTION OF NASAL SINUS: ICD-10-CM

## 2021-02-03 DIAGNOSIS — R09.81 CONGESTION OF NASAL SINUS: ICD-10-CM

## 2021-02-03 DIAGNOSIS — Z20.822 EXPOSURE TO COVID-19 VIRUS: ICD-10-CM

## 2021-02-03 PROCEDURE — U0003 INFECTIOUS AGENT DETECTION BY NUCLEIC ACID (DNA OR RNA); SEVERE ACUTE RESPIRATORY SYNDROME CORONAVIRUS 2 (SARS-COV-2) (CORONAVIRUS DISEASE [COVID-19]), AMPLIFIED PROBE TECHNIQUE, MAKING USE OF HIGH THROUGHPUT TECHNOLOGIES AS DESCRIBED BY CMS-2020-01-R: HCPCS | Performed by: FAMILY MEDICINE

## 2021-02-03 PROCEDURE — U0005 INFEC AGEN DETEC AMPLI PROBE: HCPCS | Performed by: FAMILY MEDICINE

## 2021-02-04 ENCOUNTER — TELEMEDICINE (OUTPATIENT)
Dept: FAMILY MEDICINE CLINIC | Facility: CLINIC | Age: 51
End: 2021-02-04
Payer: COMMERCIAL

## 2021-02-04 VITALS — OXYGEN SATURATION: 98 %

## 2021-02-04 DIAGNOSIS — U07.1 COVID-19: Primary | ICD-10-CM

## 2021-02-04 PROBLEM — M25.532 LEFT WRIST PAIN: Status: RESOLVED | Noted: 2019-06-05 | Resolved: 2021-02-04

## 2021-02-04 PROBLEM — J06.9 VIRAL URI: Status: RESOLVED | Noted: 2018-12-18 | Resolved: 2021-02-04

## 2021-02-04 LAB — SARS-COV-2 RNA RESP QL NAA+PROBE: POSITIVE

## 2021-02-04 PROCEDURE — 99213 OFFICE O/P EST LOW 20 MIN: CPT | Performed by: FAMILY MEDICINE

## 2021-02-04 NOTE — PROGRESS NOTES
COVID-19 Virtual Visit     Assessment/Plan:    Problem List Items Addressed This Visit        Other    COVID-19 - Primary         Disposition:     I recommended continued isolation until at least 24 hours have passed since recovery defined as resolution of fever without the use of fever-reducing medications AND improvement in COVID symptoms AND 10 days have passed since onset of symptoms (or 10 days have passed since date of first positive viral diagnostic test for asymptomatic patients)  Isolation period is over today   Return to work note     I have spent 10 minutes directly with the patient  Encounter provider Shekhar Dawkins MD    Provider located at [address]  [address] [place] [id number]    Recent Visits  No visits were found meeting these conditions  Showing recent visits within past 7 days and meeting all other requirements     Today's Visits  Date Type Provider Dept   02/04/21 Amie Gutierrez MD [facility]   Showing today's visits and meeting all other requirements     Future Appointments  No visits were found meeting these conditions  Showing future appointments within next 150 days and meeting all other requirements        Patient agrees to participate in a virtual check in via telephone or video visit instead of presenting to the office to address urgent/immediate medical needs  Patient is aware this is a billable service  After connecting through Telephone, the patient was identified by name and date of birth  Henrique Sarmiento was informed that this was a telemedicine visit and that the exam was being conducted confidentially over secure lines  My office door was closed  No one else was in the room  Jose David Smalls acknowledged consent and understanding of privacy and security of the telemedicine visit   I informed the patient that I have reviewed his record in Epic and presented the opportunity for him to ask any questions regarding the visit today  The patient agreed to participate  It was my intent to perform this visit via video technology but the patient was not able to do a video connection so the visit was completed via audio telephone only  Subjective:   Jensen Echevarria is a 44 y o  male who has been screened for COVID-19  Symptom change since last report: unchanged  Patient's symptoms include nasal congestion, rhinorrhea and cough  Patient denies fever, chills, fatigue, malaise, sore throat, anosmia, loss of taste, shortness of breath, chest tightness, abdominal pain, nausea, vomiting, diarrhea, myalgias and headaches  Dillon Apple has been staying home and has isolated themselves in his home  He is taking care to not share personal items and is cleaning all surfaces that are touched often, like counters, tabletops, and doorknobs using household cleaning sprays or wipes  He is wearing a mask when he leaves his room  Date of symptom onset: 1/23/2021  Date of positive COVID-19 PCR: 2/3/2021    Wife is COVID +  His symptoms started on 1/23/21, fatigued and congestion for past 2 weeks    Lab Results   Component Value Date    SARSCOV2 Positive (A) 02/03/2021     No past medical history on file  Past Surgical History:   Procedure Laterality Date    TONSILLECTOMY       No current outpatient medications on file  No current facility-administered medications for this visit  Allergies   Allergen Reactions    Ampicillin      nausea       Review of Systems   Constitutional: Negative for chills, fatigue and fever  HENT: Positive for congestion and rhinorrhea  Negative for sore throat  Respiratory: Positive for cough  Negative for chest tightness and shortness of breath  Gastrointestinal: Negative for abdominal pain, diarrhea, nausea and vomiting  Musculoskeletal: Negative for myalgias  Neurological: Negative for headaches       Objective:    Vitals:    02/04/21 0928   SpO2: 98%       Physical Exam Speaking in full sentences without difficulty  VIRTUAL VISIT DISCLAIMER    Jose David Smalls acknowledges that he has consented to an online visit or consultation  He understands that the online visit is based solely on information provided by him, and that, in the absence of a face-to-face physical evaluation by the physician, the diagnosis he receives is both limited and provisional in terms of accuracy and completeness  This is not intended to replace a full medical face-to-face evaluation by the physician  Jose David Smalls understands and accepts these terms

## 2021-02-04 NOTE — LETTER
February 4, 2021     Patient: Fiona Stevenson   YOB: 1970   Date of Visit: 2/4/2021       To Whom it May Concern:    Aby Miller is under my professional care  He was seen on 2/4/2021  He tested positive for COVID and has completed his 10 day isolation period as of today  He may return to work on 2/4/21  If you have any questions or concerns, please don't hesitate to call           Sincerely,          Brannon Avila MD        CC: No Recipients

## 2021-02-05 ENCOUNTER — TELEMEDICINE (OUTPATIENT)
Dept: FAMILY MEDICINE CLINIC | Facility: CLINIC | Age: 51
End: 2021-02-05
Payer: COMMERCIAL

## 2021-02-05 DIAGNOSIS — J40 BRONCHITIS: ICD-10-CM

## 2021-02-05 DIAGNOSIS — U07.1 COVID-19: Primary | ICD-10-CM

## 2021-02-05 PROCEDURE — 3725F SCREEN DEPRESSION PERFORMED: CPT | Performed by: PHYSICIAN ASSISTANT

## 2021-02-05 PROCEDURE — 1036F TOBACCO NON-USER: CPT | Performed by: PHYSICIAN ASSISTANT

## 2021-02-05 PROCEDURE — 99213 OFFICE O/P EST LOW 20 MIN: CPT | Performed by: PHYSICIAN ASSISTANT

## 2021-02-05 RX ORDER — AZITHROMYCIN 250 MG/1
TABLET, FILM COATED ORAL
Qty: 6 TABLET | Refills: 0 | Status: SHIPPED | OUTPATIENT
Start: 2021-02-05 | End: 2021-02-10

## 2021-02-05 NOTE — PROGRESS NOTES
COVID-19 Virtual Visit     Assessment/Plan:    Problem List Items Addressed This Visit        Other    COVID-19 - Primary      Other Visit Diagnoses     Bronchitis        Relevant Medications    azithromycin (Zithromax) 250 mg tablet         Disposition:     Pt was cleared of covid infection yesterday, pt woke up today with hoarseness in his voice and ongoing sx for 2 weeks  Start azithromycin, warm liquids, saline gargles, follow up if not improving  I have spent 10 minutes directly with the patient  Encounter provider Mary Yang PA-C    Provider located at [address]  [address] [place] [phone]    Recent Visits  Date Type Provider Dept   02/04/21 Telemedicine Deepali Martinez MD [facility]   Showing recent visits within past 7 days and meeting all other requirements     Today's Visits  Date Type Provider Dept   02/05/21 Telemedicine [facility]   Showing today's visits and meeting all other requirements     Future Appointments  No visits were found meeting these conditions  Showing future appointments within next 150 days and meeting all other requirements        Patient agrees to participate in a virtual check in via telephone or video visit instead of presenting to the office to address urgent/immediate medical needs  Patient is aware this is a billable service  After connecting through Telephone, the patient was identified by name and date of birth  Veronique Benedict was informed that this was a telemedicine visit and that the exam was being conducted confidentially over secure lines  My office door was closed  No one else was in the room  Jose David Smalls acknowledged consent and understanding of privacy and security of the telemedicine visit  I informed the patient that I have reviewed his record in Epic and presented the opportunity for him to ask any questions regarding the visit today   The patient agreed to participate  It was my intent to perform this visit via video technology but the patient was not able to do a video connection so the visit was completed via audio telephone only  Subjective:   Jana Reddy is a 44 y o  male who has been screened for COVID-19  Symptom change since last report: worsening  Patient is currently asymptomatic  Patient's symptoms include nasal congestion, rhinorrhea, sore throat and cough  Patient denies fever, chills, fatigue, malaise, anosmia, loss of taste, shortness of breath, chest tightness, abdominal pain, nausea, vomiting, diarrhea, myalgias and headaches  Rachel Tenorio has been staying home and has isolated themselves in his home  He is taking care to not share personal items and is cleaning all surfaces that are touched often, like counters, tabletops, and doorknobs using household cleaning sprays or wipes  He is wearing a mask when he leaves his room  Date of symptom onset: 1/22/2021    Pt was cleared of covid infection yesterday, pt shares he woke this morning with no voice  Shares his throat hurt and he continues to cough and be congested  He is concerned about possibly having strep or bronchitis because his sx are not going away     Lab Results   Component Value Date    SARSCOV2 Positive (A) 02/03/2021     History reviewed  No pertinent past medical history  Past Surgical History:   Procedure Laterality Date    TONSILLECTOMY       Current Outpatient Medications   Medication Sig Dispense Refill    azithromycin (Zithromax) 250 mg tablet Take 2 tablets (500 mg total) by mouth daily for 1 day, THEN 1 tablet (250 mg total) daily for 4 days  6 tablet 0     No current facility-administered medications for this visit  Allergies   Allergen Reactions    Ampicillin      nausea       Review of Systems   Constitutional: Negative for chills, fatigue and fever  HENT: Positive for congestion, rhinorrhea, sore throat and voice change      Respiratory: Positive for cough  Negative for chest tightness and shortness of breath  Gastrointestinal: Negative for abdominal pain, diarrhea, nausea and vomiting  Musculoskeletal: Negative for myalgias  Neurological: Negative for headaches  Objective: There were no vitals filed for this visit  Physical Exam  Vitals signs and nursing note reviewed  Constitutional:       General: He is not in acute distress  HENT:      Mouth/Throat:      Comments: Voice is hoarse  Neurological:      Mental Status: He is alert and oriented to person, place, and time  Psychiatric:         Mood and Affect: Mood normal          Behavior: Behavior normal        VIRTUAL VISIT DISCLAIMER    Jose David Smalls acknowledges that he has consented to an online visit or consultation  He understands that the online visit is based solely on information provided by him, and that, in the absence of a face-to-face physical evaluation by the physician, the diagnosis he receives is both limited and provisional in terms of accuracy and completeness  This is not intended to replace a full medical face-to-face evaluation by the physician  Jose David Smalls understands and accepts these terms

## 2021-05-13 ENCOUNTER — VBI (OUTPATIENT)
Dept: ADMINISTRATIVE | Facility: OTHER | Age: 51
End: 2021-05-13

## 2021-07-13 ENCOUNTER — OFFICE VISIT (OUTPATIENT)
Dept: FAMILY MEDICINE CLINIC | Facility: CLINIC | Age: 51
End: 2021-07-13
Payer: COMMERCIAL

## 2021-07-13 ENCOUNTER — APPOINTMENT (OUTPATIENT)
Dept: LAB | Facility: CLINIC | Age: 51
End: 2021-07-13
Payer: COMMERCIAL

## 2021-07-13 VITALS
BODY MASS INDEX: 30.07 KG/M2 | RESPIRATION RATE: 18 BRPM | OXYGEN SATURATION: 97 % | SYSTOLIC BLOOD PRESSURE: 112 MMHG | HEART RATE: 66 BPM | WEIGHT: 203 LBS | HEIGHT: 69 IN | DIASTOLIC BLOOD PRESSURE: 80 MMHG

## 2021-07-13 DIAGNOSIS — E55.9 VITAMIN D DEFICIENCY: ICD-10-CM

## 2021-07-13 DIAGNOSIS — R53.83 FATIGUE, UNSPECIFIED TYPE: ICD-10-CM

## 2021-07-13 DIAGNOSIS — Z00.00 HEALTH MAINTENANCE EXAMINATION: Primary | ICD-10-CM

## 2021-07-13 DIAGNOSIS — Z13.220 SCREENING FOR LIPID DISORDERS: ICD-10-CM

## 2021-07-13 DIAGNOSIS — Z13.1 SCREENING FOR DIABETES MELLITUS: ICD-10-CM

## 2021-07-13 DIAGNOSIS — R01.1 CARDIAC MURMUR: ICD-10-CM

## 2021-07-13 DIAGNOSIS — Z12.5 SCREENING FOR PROSTATE CANCER: ICD-10-CM

## 2021-07-13 DIAGNOSIS — Z12.11 SCREEN FOR COLON CANCER: ICD-10-CM

## 2021-07-13 LAB
25(OH)D3 SERPL-MCNC: 20.7 NG/ML (ref 30–100)
ALBUMIN SERPL BCP-MCNC: 3.6 G/DL (ref 3.5–5)
ALP SERPL-CCNC: 35 U/L (ref 46–116)
ALT SERPL W P-5'-P-CCNC: 46 U/L (ref 12–78)
ANION GAP SERPL CALCULATED.3IONS-SCNC: 6 MMOL/L (ref 4–13)
AST SERPL W P-5'-P-CCNC: 32 U/L (ref 5–45)
BASOPHILS # BLD AUTO: 0.04 THOUSANDS/ΜL (ref 0–0.1)
BASOPHILS NFR BLD AUTO: 1 % (ref 0–1)
BILIRUB SERPL-MCNC: 0.65 MG/DL (ref 0.2–1)
BUN SERPL-MCNC: 15 MG/DL (ref 5–25)
CALCIUM SERPL-MCNC: 8.7 MG/DL (ref 8.3–10.1)
CHLORIDE SERPL-SCNC: 106 MMOL/L (ref 100–108)
CHOLEST SERPL-MCNC: 180 MG/DL (ref 50–200)
CO2 SERPL-SCNC: 25 MMOL/L (ref 21–32)
CREAT SERPL-MCNC: 1.18 MG/DL (ref 0.6–1.3)
EOSINOPHIL # BLD AUTO: 0.05 THOUSAND/ΜL (ref 0–0.61)
EOSINOPHIL NFR BLD AUTO: 1 % (ref 0–6)
ERYTHROCYTE [DISTWIDTH] IN BLOOD BY AUTOMATED COUNT: 14 % (ref 11.6–15.1)
GFR SERPL CREATININE-BSD FRML MDRD: 70 ML/MIN/1.73SQ M
GLUCOSE P FAST SERPL-MCNC: 70 MG/DL (ref 65–99)
HCT VFR BLD AUTO: 44.5 % (ref 36.5–49.3)
HDLC SERPL-MCNC: 32 MG/DL
HGB BLD-MCNC: 14.4 G/DL (ref 12–17)
IMM GRANULOCYTES # BLD AUTO: 0.02 THOUSAND/UL (ref 0–0.2)
IMM GRANULOCYTES NFR BLD AUTO: 0 % (ref 0–2)
LDLC SERPL CALC-MCNC: 134 MG/DL (ref 0–100)
LYMPHOCYTES # BLD AUTO: 2.22 THOUSANDS/ΜL (ref 0.6–4.47)
LYMPHOCYTES NFR BLD AUTO: 33 % (ref 14–44)
MCH RBC QN AUTO: 33 PG (ref 26.8–34.3)
MCHC RBC AUTO-ENTMCNC: 32.4 G/DL (ref 31.4–37.4)
MCV RBC AUTO: 102 FL (ref 82–98)
MONOCYTES # BLD AUTO: 0.67 THOUSAND/ΜL (ref 0.17–1.22)
MONOCYTES NFR BLD AUTO: 10 % (ref 4–12)
NEUTROPHILS # BLD AUTO: 3.68 THOUSANDS/ΜL (ref 1.85–7.62)
NEUTS SEG NFR BLD AUTO: 55 % (ref 43–75)
NRBC BLD AUTO-RTO: 0 /100 WBCS
PLATELET # BLD AUTO: 301 THOUSANDS/UL (ref 149–390)
PMV BLD AUTO: 9.2 FL (ref 8.9–12.7)
POTASSIUM SERPL-SCNC: 4 MMOL/L (ref 3.5–5.3)
PROT SERPL-MCNC: 7.7 G/DL (ref 6.4–8.2)
PSA SERPL-MCNC: 0.4 NG/ML (ref 0–4)
RBC # BLD AUTO: 4.37 MILLION/UL (ref 3.88–5.62)
SODIUM SERPL-SCNC: 137 MMOL/L (ref 136–145)
TRIGL SERPL-MCNC: 69 MG/DL
TSH SERPL DL<=0.05 MIU/L-ACNC: 1.41 UIU/ML (ref 0.36–3.74)
VIT B12 SERPL-MCNC: 289 PG/ML (ref 100–900)
WBC # BLD AUTO: 6.68 THOUSAND/UL (ref 4.31–10.16)

## 2021-07-13 PROCEDURE — 80061 LIPID PANEL: CPT

## 2021-07-13 PROCEDURE — 85025 COMPLETE CBC W/AUTO DIFF WBC: CPT

## 2021-07-13 PROCEDURE — 86618 LYME DISEASE ANTIBODY: CPT

## 2021-07-13 PROCEDURE — 80053 COMPREHEN METABOLIC PANEL: CPT

## 2021-07-13 PROCEDURE — 36415 COLL VENOUS BLD VENIPUNCTURE: CPT

## 2021-07-13 PROCEDURE — 84443 ASSAY THYROID STIM HORMONE: CPT

## 2021-07-13 PROCEDURE — 3008F BODY MASS INDEX DOCD: CPT | Performed by: PHYSICIAN ASSISTANT

## 2021-07-13 PROCEDURE — G0103 PSA SCREENING: HCPCS

## 2021-07-13 PROCEDURE — 99396 PREV VISIT EST AGE 40-64: CPT | Performed by: PHYSICIAN ASSISTANT

## 2021-07-13 PROCEDURE — 82607 VITAMIN B-12: CPT

## 2021-07-13 PROCEDURE — 82306 VITAMIN D 25 HYDROXY: CPT

## 2021-07-13 PROCEDURE — 1036F TOBACCO NON-USER: CPT | Performed by: PHYSICIAN ASSISTANT

## 2021-07-13 NOTE — PROGRESS NOTES
Assessment/Plan:       Problem List Items Addressed This Visit        Other    Fatigue    Relevant Orders    CBC and differential    TSH, 3rd generation with Free T4 reflex    Lyme Antibody Profile with reflex to WB    Vitamin B12    Testosterone, free, total    Vitamin D deficiency    Relevant Orders    Vitamin D 25 hydroxy    Screening for diabetes mellitus    Relevant Orders    Comprehensive metabolic panel      Other Visit Diagnoses     Health maintenance examination    -  Primary    Screen for colon cancer        Relevant Orders    Ambulatory referral to Gastroenterology    Screening for prostate cancer        Relevant Orders    PSA, Total Screen    Screening for lipid disorders        Relevant Orders    Lipid Panel with Direct LDL reflex    Cardiac murmur        Relevant Orders    Echo complete with contrast if indicated            Subjective:      Patient ID: Kb Gold is a 46 y o  male  Pt presents for annual wellness exam  He notes no changes to PMH  He denies changes in social hx  He occasionally will smoke a cigar, no regular tobacco use  No abuse or misuse of drugs or alcohol  He does take supplements inclunding sarms  He notes he is intermittently signficantly fatigued, particularly around midday  He shares he is exercising more regularly and eating clean  He does share he gets a good nights sleep and feels rested in the morning  No other associated sx  No CP, SOB, MULLEN, syncope, weight loss, fevers, chills, changes in appetite  He is due for CRC screening, he has never had a colonoscopy       The following portions of the patient's history were reviewed and updated as appropriate:   He  has no past medical history on file    He   Patient Active Problem List    Diagnosis Date Noted    COVID-19 02/04/2021    Colon cancer screening 06/05/2019    Prostate cancer screening 06/05/2019    Screening for diabetes mellitus 06/05/2019    Arthralgia 06/05/2019    Numbness and tingling of right arm 06/05/2019    Benign paroxysmal positional vertigo 12/18/2018    Vitamin D deficiency 02/12/2018    Fatigue 02/08/2018    Lipid screening 02/08/2018    Osteoarthritis of left hip 11/23/2016     He  has a past surgical history that includes Tonsillectomy  His family history includes Atrial fibrillation in his father; Breast cancer in his mother; Stomach cancer in his paternal uncle  He  reports that he has never smoked  He has never used smokeless tobacco  He reports current alcohol use  He reports that he does not use drugs  No current outpatient medications on file  No current facility-administered medications for this visit  No current outpatient medications on file prior to visit  No current facility-administered medications on file prior to visit  He is allergic to ampicillin       Review of Systems   Constitutional: Positive for fatigue  Negative for chills and fever  HENT: Negative for congestion, ear pain, hearing loss, nosebleeds, postnasal drip, rhinorrhea, sinus pressure, sinus pain, sneezing and sore throat  Eyes: Negative for pain, discharge, itching and visual disturbance  Respiratory: Negative for cough, chest tightness, shortness of breath and wheezing  Cardiovascular: Negative for chest pain, palpitations and leg swelling  Gastrointestinal: Negative for abdominal pain, blood in stool, constipation, diarrhea, nausea and vomiting  Genitourinary: Negative for frequency and urgency  Musculoskeletal: Negative  Skin: Negative  Neurological: Negative for dizziness, syncope, light-headedness, numbness and headaches  Psychiatric/Behavioral: Negative  Objective:      /80 (BP Location: Left arm, Patient Position: Sitting)   Pulse 66   Resp 18   Ht 5' 9" (1 753 m)   Wt 92 1 kg (203 lb)   SpO2 97%   BMI 29 98 kg/m²          Physical Exam  Vitals and nursing note reviewed  Constitutional:       General: He is not in acute distress  Appearance: Normal appearance  HENT:      Head: Normocephalic and atraumatic  Right Ear: Tympanic membrane, ear canal and external ear normal       Left Ear: Tympanic membrane, ear canal and external ear normal       Nose: Nose normal       Mouth/Throat:      Mouth: Mucous membranes are moist       Pharynx: Oropharynx is clear  No oropharyngeal exudate or posterior oropharyngeal erythema  Eyes:      Pupils: Pupils are equal, round, and reactive to light  Cardiovascular:      Rate and Rhythm: Normal rate and regular rhythm  Pulses: Normal pulses  Heart sounds: Normal heart sounds  No murmur heard  Pulmonary:      Effort: Pulmonary effort is normal  No respiratory distress  Breath sounds: Normal breath sounds  No wheezing, rhonchi or rales  Abdominal:      General: Bowel sounds are normal  There is no distension  Palpations: Abdomen is soft  Tenderness: There is no abdominal tenderness  There is no guarding  Musculoskeletal:         General: No tenderness  Normal range of motion  Cervical back: Normal range of motion and neck supple  Right lower leg: No edema  Left lower leg: No edema  Lymphadenopathy:      Cervical: No cervical adenopathy  Skin:     General: Skin is warm and dry  Coloration: Skin is not pale  Findings: No erythema or rash  Neurological:      Mental Status: He is alert and oriented to person, place, and time  Psychiatric:         Mood and Affect: Mood and affect normal        BMI Counseling: Body mass index is 29 98 kg/m²  The BMI is above normal  Nutrition recommendations include moderation in carbohydrate intake, increasing intake of lean protein, reducing intake of saturated fat and trans fat and reducing intake of cholesterol  Exercise recommendations include moderate aerobic physical activity for 150 minutes/week

## 2021-07-14 LAB — B BURGDOR IGG+IGM SER-ACNC: 30

## 2021-07-20 ENCOUNTER — HOSPITAL ENCOUNTER (OUTPATIENT)
Dept: VASCULAR ULTRASOUND | Facility: HOSPITAL | Age: 51
Discharge: HOME | End: 2021-07-20
Payer: COMMERCIAL

## 2021-07-20 ENCOUNTER — APPOINTMENT (OUTPATIENT)
Dept: LAB | Facility: CLINIC | Age: 51
End: 2021-07-20
Payer: COMMERCIAL

## 2021-07-20 ENCOUNTER — OFFICE VISIT (OUTPATIENT)
Dept: FAMILY MEDICINE CLINIC | Facility: CLINIC | Age: 51
End: 2021-07-20
Payer: COMMERCIAL

## 2021-07-20 VITALS
TEMPERATURE: 97.7 F | HEIGHT: 69 IN | SYSTOLIC BLOOD PRESSURE: 130 MMHG | BODY MASS INDEX: 29.92 KG/M2 | HEART RATE: 62 BPM | OXYGEN SATURATION: 98 % | WEIGHT: 202 LBS | DIASTOLIC BLOOD PRESSURE: 80 MMHG

## 2021-07-20 DIAGNOSIS — M79.89 SWELLING OF RIGHT LOWER EXTREMITY: ICD-10-CM

## 2021-07-20 DIAGNOSIS — M79.89 SWELLING OF RIGHT LOWER EXTREMITY: Primary | ICD-10-CM

## 2021-07-20 DIAGNOSIS — R53.83 FATIGUE, UNSPECIFIED TYPE: ICD-10-CM

## 2021-07-20 PROCEDURE — 84403 ASSAY OF TOTAL TESTOSTERONE: CPT

## 2021-07-20 PROCEDURE — 36415 COLL VENOUS BLD VENIPUNCTURE: CPT

## 2021-07-20 PROCEDURE — 93971 EXTREMITY STUDY: CPT

## 2021-07-20 PROCEDURE — 84402 ASSAY OF FREE TESTOSTERONE: CPT

## 2021-07-20 PROCEDURE — 1036F TOBACCO NON-USER: CPT | Performed by: PHYSICIAN ASSISTANT

## 2021-07-20 PROCEDURE — 99214 OFFICE O/P EST MOD 30 MIN: CPT | Performed by: PHYSICIAN ASSISTANT

## 2021-07-20 PROCEDURE — 93971 EXTREMITY STUDY: CPT | Performed by: SURGERY

## 2021-07-20 PROCEDURE — 3008F BODY MASS INDEX DOCD: CPT | Performed by: PHYSICIAN ASSISTANT

## 2021-07-20 NOTE — PROGRESS NOTES
Assessment/Plan:    No problem-specific Assessment & Plan notes found for this encounter  Problem List Items Addressed This Visit     None      Visit Diagnoses     Swelling of right lower extremity    -  Primary    Relevant Orders    VAS lower limb venous duplex study, unilateral/limited          Stat ultrasound ordered  Labs reviewed from 07/13/2021  Patient was negative for Lyme  Cbc and CMP within normal limits    Subjective:      Patient ID: Kb Gold is a 46 y o  male  41-year-old male presents for swelling of the right lower extremity  Patient noticed swelling localized in his right foot  Patient is active and in the gym as well as martial arts  He has not had any injury however he states  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  No recent travel however he has concerns as he is traveling to [facility] on Friday  No history of VTE  The following portions of the patient's history were reviewed and updated as appropriate: allergies, current medications, past family history, past medical history, past social history and problem list     Review of Systems   Constitutional: Negative for chills, fatigue and fever  HENT: Negative for congestion, ear pain, sinus pain, sore throat and trouble swallowing  Eyes: Negative for pain, discharge and redness  Respiratory: Negative for cough, chest tightness, shortness of breath and wheezing  Cardiovascular: Negative for chest pain, palpitations and leg swelling  Gastrointestinal: Negative for abdominal pain, diarrhea, nausea and vomiting  Musculoskeletal: Positive for joint swelling  Negative for arthralgias and myalgias  Skin: Negative for rash  Neurological: Negative for dizziness, weakness, numbness and headaches           Objective:      /80 (BP Location: Left arm, Patient Position: Sitting, Cuff Size: Standard)   Pulse 62   Temp 97 7 °F (36 5 °C)   Ht 5' 9" (1 753 m)   Wt 91 6 kg (202 lb)   SpO2 98%   BMI 29 83 kg/m² Physical Exam  Constitutional:       General: He is not in acute distress  Appearance: He is well-developed  Cardiovascular:      Rate and Rhythm: Normal rate and regular rhythm  Heart sounds: Normal heart sounds  Pulmonary:      Effort: Pulmonary effort is normal       Breath sounds: Normal breath sounds  Musculoskeletal:      Right lower leg: Normal  No swelling  No edema  Left lower leg: Normal  No swelling  No edema          Legs:

## 2021-07-21 LAB
TESTOST FREE SERPL-MCNC: 4.6 PG/ML (ref 7.2–24)
TESTOST SERPL-MCNC: 65 NG/DL (ref 264–916)

## 2021-08-25 ENCOUNTER — HOSPITAL ENCOUNTER (OUTPATIENT)
Dept: NON INVASIVE DIAGNOSTICS | Facility: CLINIC | Age: 51
Discharge: HOME | End: 2021-08-25
Payer: COMMERCIAL

## 2021-08-25 DIAGNOSIS — R01.1 CARDIAC MURMUR: ICD-10-CM

## 2021-08-25 PROCEDURE — 93306 TTE W/DOPPLER COMPLETE: CPT | Performed by: INTERNAL MEDICINE

## 2021-08-25 PROCEDURE — 93306 TTE W/DOPPLER COMPLETE: CPT

## 2021-09-03 ENCOUNTER — APPOINTMENT (OUTPATIENT)
Dept: LAB | Facility: CLINIC | Age: 51
End: 2021-09-03
Payer: COMMERCIAL

## 2021-09-03 DIAGNOSIS — R79.89 LOW TESTOSTERONE: ICD-10-CM

## 2021-09-03 PROCEDURE — 36415 COLL VENOUS BLD VENIPUNCTURE: CPT

## 2021-09-03 PROCEDURE — 84403 ASSAY OF TOTAL TESTOSTERONE: CPT

## 2021-09-03 PROCEDURE — 84402 ASSAY OF FREE TESTOSTERONE: CPT

## 2021-09-04 LAB
TESTOST FREE SERPL-MCNC: 5.7 PG/ML (ref 7.2–24)
TESTOST SERPL-MCNC: 76 NG/DL (ref 264–916)

## 2021-09-07 DIAGNOSIS — E29.1 HYPOGONADISM IN MALE: Primary | ICD-10-CM

## 2021-09-08 ENCOUNTER — PREP FOR PROCEDURE (OUTPATIENT)
Dept: GASTROENTEROLOGY | Facility: CLINIC | Age: 51
End: 2021-09-08

## 2021-09-08 DIAGNOSIS — Z12.11 SCREENING FOR COLON CANCER: Primary | ICD-10-CM

## 2021-09-27 NOTE — PROGRESS NOTES
9/28/2021      Chief Complaint   Patient presents with    Hypogonadism         Assessment and Plan    46 y o  male -- New patient    1  Low testosterone  - Most recent testosterone total 76, previously 65  - Most recent PSA 0 4  - Discussed initiation of testosterone replacement therapy in the form of gels vs injections  Risks and benefits of both discussed  Patient interested in injection therapies at this time  - Discussed need for prostate cancer screening with replacement therapy  ADELAIDA today showing no abnormalities  - FSH, LH and prolactin ordered  Will have this done prior to starting injections  - Testosterone cypionate 100 mg/mL IM injection sent to pharmacy  Patient will return for RN visit for teaching  - Will have repeat testosterone and CBC in 3 months  Explained to patient need to go for labs midcycle for most accurate levels  - Will follow up in office to review labs in 3 months  - Will call with any questions or concerns  - All questions answered; patient understands and agrees with plan      History of Present Illness  Bob Stalashell Tanvi Camraillo is a 46 y o  male new patient here for evaluation of low testosterone  Patient was experiencing fatigue daily  Testosterone ordered in July was 65  Repeat testosterone 76  Patient states he exercises regularly and eats well  He has been feeling fatigue for many years  Denies urinary symptoms or erectile dysfunction  Denies seeing urology in the past  Denies family history of  malignancies  Most recent PSA 0 4  Denies frequency, urgency, dysuria, gross hematuria, flank pain, suprapubic pain, fevers, chills  Denies history of cardiac problems or blood clots  Review of Systems   Constitutional: Positive for fatigue  Negative for activity change, appetite change, chills and fever  HENT: Negative for congestion and trouble swallowing  Respiratory: Negative for cough and shortness of breath      Cardiovascular: Negative for chest pain, palpitations and leg swelling  Gastrointestinal: Negative for abdominal pain, constipation, diarrhea, nausea and vomiting  Genitourinary: Negative for difficulty urinating, dysuria, flank pain, frequency, hematuria and urgency  Musculoskeletal: Negative for back pain and gait problem  Skin: Negative for wound  Allergic/Immunologic: Negative for immunocompromised state  Neurological: Negative for dizziness and syncope  Hematological: Does not bruise/bleed easily  Psychiatric/Behavioral: Negative for confusion  All other systems reviewed and are negative  Vitals  Vitals:    09/28/21 0731   BP: 118/72   Pulse: 62   Weight: 92 kg (202 lb 12 8 oz)   Height: 5' 9" (1 753 m)       Physical Exam  Constitutional:       Appearance: Normal appearance  HENT:      Head: Normocephalic  Pulmonary:      Effort: Pulmonary effort is normal    Genitourinary:     Comments: Prostate smooth, symmetric, no palpable nodules  Musculoskeletal:      Cervical back: Normal range of motion  Skin:     General: Skin is warm and dry  Neurological:      General: No focal deficit present  Mental Status: He is alert and oriented to person, place, and time  Psychiatric:         Mood and Affect: Mood normal          Behavior: Behavior normal          Thought Content:  Thought content normal          Judgment: Judgment normal            Past History  Past Medical History:   Diagnosis Date    Hypogonadism male      Social History     Socioeconomic History    Marital status: /Civil Union     Spouse name: None    Number of children: None    Years of education: None    Highest education level: None   Occupational History    None   Tobacco Use    Smoking status: Current Some Day Smoker     Types: Cigars    Smokeless tobacco: Never Used   Vaping Use    Vaping Use: Never used   Substance and Sexual Activity    Alcohol use: Yes     Comment: rare, social      Drug use: No    Sexual activity: None   Other Topics Concern    None   Social History Narrative    None     Social Determinants of Health     Financial Resource Strain:     Difficulty of Paying Living Expenses:    Food Insecurity:     Worried About Running Out of Food in the Last Year:     [address] in the Last Year:    Transportation Needs:     Lack of Transportation (Medical):      Lack of Transportation (Non-Medical):    Physical Activity:     Days of Exercise per Week:     Minutes of Exercise per Session:    Stress:     Feeling of Stress :    Social Connections:     Frequency of Communication with Friends and Family:     Frequency of Social Gatherings with Friends and Family:     Attends [employer] Services:     Active Member of Clubs or Organizations:     Attends Club or Organization Meetings:     Marital Status:    Intimate Partner Violence:     Fear of Current or Ex-Partner:     Emotionally Abused:     Physically Abused:     Sexually Abused:      Social History     Tobacco Use   Smoking Status Current Some Day Smoker    Types: Cigars   Smokeless Tobacco Never Used     Family History   Problem Relation Age of Onset    Breast cancer Mother     Atrial fibrillation Father     Stomach cancer Paternal Uncle        The following portions of the patient's history were reviewed and updated as appropriate: allergies, current medications, past medical history, past social history, past surgical history and problem list     Results  No results found for this or any previous visit (from the past 1 hour(s)) ]  Lab Results   Component Value Date    PSA 0 4 07/13/2021    PSA 0 4 02/09/2018     Lab Results   Component Value Date    CALCIUM 8 7 07/13/2021    K 4 0 07/13/2021    CO2 25 07/13/2021     07/13/2021    BUN 15 07/13/2021    CREATININE 1 18 07/13/2021     Lab Results   Component Value Date    WBC 6 68 07/13/2021    HGB 14 4 07/13/2021    HCT 44 5 07/13/2021     (H) 07/13/2021     07/13/2021       Kathy Johnston PA-C

## 2021-09-28 ENCOUNTER — CONSULT (OUTPATIENT)
Dept: UROLOGY | Facility: CLINIC | Age: 51
End: 2021-09-28
Payer: COMMERCIAL

## 2021-09-28 VITALS
HEART RATE: 62 BPM | DIASTOLIC BLOOD PRESSURE: 72 MMHG | BODY MASS INDEX: 30.04 KG/M2 | WEIGHT: 202.8 LBS | SYSTOLIC BLOOD PRESSURE: 118 MMHG | HEIGHT: 69 IN

## 2021-09-28 DIAGNOSIS — E29.1 HYPOGONADISM IN MALE: ICD-10-CM

## 2021-09-28 PROCEDURE — 99204 OFFICE O/P NEW MOD 45 MIN: CPT | Performed by: PHYSICIAN ASSISTANT

## 2021-09-28 PROCEDURE — 3008F BODY MASS INDEX DOCD: CPT | Performed by: PHYSICIAN ASSISTANT

## 2021-09-28 RX ORDER — TESTOSTERONE CYPIONATE 100 MG/ML
100 INJECTION, SOLUTION INTRAMUSCULAR
Status: DISCONTINUED | OUTPATIENT
Start: 2021-09-28 | End: 2021-09-28

## 2021-09-28 RX ORDER — TESTOSTERONE CYPIONATE 100 MG/ML
1 VIAL (ML) INTRAMUSCULAR
Qty: 5 ML | Refills: 1 | Status: SHIPPED | OUTPATIENT
Start: 2021-09-28 | End: 2022-01-04 | Stop reason: SDUPTHER

## 2021-10-04 ENCOUNTER — APPOINTMENT (OUTPATIENT)
Dept: LAB | Facility: CLINIC | Age: 51
End: 2021-10-04
Payer: COMMERCIAL

## 2021-10-04 DIAGNOSIS — E29.1 HYPOGONADISM IN MALE: ICD-10-CM

## 2021-10-04 LAB
FSH SERPL-ACNC: 4.9 MIU/ML (ref 0.7–10.8)
LH SERPL-ACNC: 2.7 MIU/ML (ref 1.2–10.6)
PROLACTIN SERPL-MCNC: 15.2 NG/ML (ref 2.5–17.4)
PSA SERPL-MCNC: 0.4 NG/ML (ref 0–4)

## 2021-10-04 PROCEDURE — 84153 ASSAY OF PSA TOTAL: CPT

## 2021-10-04 PROCEDURE — 83002 ASSAY OF GONADOTROPIN (LH): CPT

## 2021-10-04 PROCEDURE — 36415 COLL VENOUS BLD VENIPUNCTURE: CPT

## 2021-10-04 PROCEDURE — 84146 ASSAY OF PROLACTIN: CPT

## 2021-10-04 PROCEDURE — 83001 ASSAY OF GONADOTROPIN (FSH): CPT

## 2021-10-07 ENCOUNTER — PROCEDURE VISIT (OUTPATIENT)
Dept: UROLOGY | Facility: CLINIC | Age: 51
End: 2021-10-07
Payer: COMMERCIAL

## 2021-10-07 VITALS
WEIGHT: 206 LBS | SYSTOLIC BLOOD PRESSURE: 124 MMHG | DIASTOLIC BLOOD PRESSURE: 76 MMHG | BODY MASS INDEX: 30.51 KG/M2 | HEIGHT: 69 IN | HEART RATE: 64 BPM

## 2021-10-07 DIAGNOSIS — E29.1 HYPOGONADISM IN MALE: ICD-10-CM

## 2021-10-07 PROCEDURE — 3008F BODY MASS INDEX DOCD: CPT

## 2021-10-07 PROCEDURE — 99211 OFF/OP EST MAY X REQ PHY/QHP: CPT

## 2021-10-15 ENCOUNTER — TELEPHONE (OUTPATIENT)
Dept: UROLOGY | Facility: MEDICAL CENTER | Age: 51
End: 2021-10-15

## 2021-10-21 ENCOUNTER — TELEPHONE (OUTPATIENT)
Dept: GASTROENTEROLOGY | Facility: HOSPITAL | Age: 51
End: 2021-10-21

## 2021-10-22 ENCOUNTER — ANESTHESIA (OUTPATIENT)
Dept: GASTROENTEROLOGY | Facility: HOSPITAL | Age: 51
End: 2021-10-22

## 2021-10-22 ENCOUNTER — HOSPITAL ENCOUNTER (OUTPATIENT)
Dept: GASTROENTEROLOGY | Facility: HOSPITAL | Age: 51
Setting detail: OUTPATIENT SURGERY
Discharge: HOME | End: 2021-10-22
Attending: INTERNAL MEDICINE | Admitting: INTERNAL MEDICINE
Payer: COMMERCIAL

## 2021-10-22 ENCOUNTER — ANESTHESIA EVENT (OUTPATIENT)
Dept: GASTROENTEROLOGY | Facility: HOSPITAL | Age: 51
End: 2021-10-22

## 2021-10-22 VITALS
OXYGEN SATURATION: 100 % | SYSTOLIC BLOOD PRESSURE: 110 MMHG | BODY MASS INDEX: 28.83 KG/M2 | RESPIRATION RATE: 16 BRPM | WEIGHT: 194.67 LBS | HEART RATE: 70 BPM | TEMPERATURE: 97.8 F | DIASTOLIC BLOOD PRESSURE: 70 MMHG | HEIGHT: 69 IN

## 2021-10-22 DIAGNOSIS — Z12.11 SCREENING FOR COLON CANCER: ICD-10-CM

## 2021-10-22 PROCEDURE — G0121 COLON CA SCRN NOT HI RSK IND: HCPCS | Performed by: INTERNAL MEDICINE

## 2021-10-22 RX ORDER — LIDOCAINE HYDROCHLORIDE 10 MG/ML
INJECTION, SOLUTION EPIDURAL; INFILTRATION; INTRACAUDAL; PERINEURAL AS NEEDED
Status: DISCONTINUED | OUTPATIENT
Start: 2021-10-22 | End: 2021-10-22

## 2021-10-22 RX ORDER — SODIUM CHLORIDE, SODIUM LACTATE, POTASSIUM CHLORIDE, CALCIUM CHLORIDE 600; 310; 30; 20 MG/100ML; MG/100ML; MG/100ML; MG/100ML
125 INJECTION, SOLUTION INTRAVENOUS CONTINUOUS
Status: DISCONTINUED | OUTPATIENT
Start: 2021-10-22 | End: 2021-10-26 | Stop reason: HOSPADM

## 2021-10-22 RX ORDER — PROPOFOL 10 MG/ML
INJECTION, EMULSION INTRAVENOUS AS NEEDED
Status: DISCONTINUED | OUTPATIENT
Start: 2021-10-22 | End: 2021-10-22

## 2021-10-22 RX ADMIN — LIDOCAINE HYDROCHLORIDE 50 MG: 10 INJECTION, SOLUTION EPIDURAL; INFILTRATION; INTRACAUDAL; PERINEURAL at 09:28

## 2021-10-22 RX ADMIN — PROPOFOL 50 MG: 10 INJECTION, EMULSION INTRAVENOUS at 09:30

## 2021-10-22 RX ADMIN — PROPOFOL 50 MG: 10 INJECTION, EMULSION INTRAVENOUS at 09:29

## 2021-10-22 RX ADMIN — PROPOFOL 50 MG: 10 INJECTION, EMULSION INTRAVENOUS at 09:34

## 2021-10-22 RX ADMIN — SODIUM CHLORIDE, SODIUM LACTATE, POTASSIUM CHLORIDE, AND CALCIUM CHLORIDE 125 ML/HR: .6; .31; .03; .02 INJECTION, SOLUTION INTRAVENOUS at 08:13

## 2021-10-22 RX ADMIN — PROPOFOL 100 MG: 10 INJECTION, EMULSION INTRAVENOUS at 09:28

## 2021-12-03 ENCOUNTER — TELEPHONE (OUTPATIENT)
Dept: HEMATOLOGY ONCOLOGY | Facility: CLINIC | Age: 51
End: 2021-12-03

## 2021-12-13 ENCOUNTER — TELEPHONE (OUTPATIENT)
Dept: OTHER | Facility: OTHER | Age: 51
End: 2021-12-13

## 2021-12-13 DIAGNOSIS — E29.1 HYPOGONADISM IN MALE: ICD-10-CM

## 2021-12-15 ENCOUNTER — APPOINTMENT (OUTPATIENT)
Dept: LAB | Facility: CLINIC | Age: 51
End: 2021-12-15
Payer: COMMERCIAL

## 2021-12-15 LAB
BASOPHILS # BLD AUTO: 0.03 THOUSANDS/ΜL (ref 0–0.1)
BASOPHILS NFR BLD AUTO: 1 % (ref 0–1)
EOSINOPHIL # BLD AUTO: 0.16 THOUSAND/ΜL (ref 0–0.61)
EOSINOPHIL NFR BLD AUTO: 3 % (ref 0–6)
ERYTHROCYTE [DISTWIDTH] IN BLOOD BY AUTOMATED COUNT: 13.2 % (ref 11.6–15.1)
HCT VFR BLD AUTO: 43.8 % (ref 36.5–49.3)
HGB BLD-MCNC: 14.5 G/DL (ref 12–17)
IMM GRANULOCYTES # BLD AUTO: 0.01 THOUSAND/UL (ref 0–0.2)
IMM GRANULOCYTES NFR BLD AUTO: 0 % (ref 0–2)
LYMPHOCYTES # BLD AUTO: 2.64 THOUSANDS/ΜL (ref 0.6–4.47)
LYMPHOCYTES NFR BLD AUTO: 45 % (ref 14–44)
MCH RBC QN AUTO: 33.2 PG (ref 26.8–34.3)
MCHC RBC AUTO-ENTMCNC: 33.1 G/DL (ref 31.4–37.4)
MCV RBC AUTO: 100 FL (ref 82–98)
MONOCYTES # BLD AUTO: 0.77 THOUSAND/ΜL (ref 0.17–1.22)
MONOCYTES NFR BLD AUTO: 13 % (ref 4–12)
NEUTROPHILS # BLD AUTO: 2.24 THOUSANDS/ΜL (ref 1.85–7.62)
NEUTS SEG NFR BLD AUTO: 38 % (ref 43–75)
NRBC BLD AUTO-RTO: 0 /100 WBCS
PLATELET # BLD AUTO: 251 THOUSANDS/UL (ref 149–390)
PMV BLD AUTO: 9.2 FL (ref 8.9–12.7)
RBC # BLD AUTO: 4.37 MILLION/UL (ref 3.88–5.62)
WBC # BLD AUTO: 5.85 THOUSAND/UL (ref 4.31–10.16)

## 2021-12-15 PROCEDURE — 36415 COLL VENOUS BLD VENIPUNCTURE: CPT

## 2021-12-15 PROCEDURE — 84402 ASSAY OF FREE TESTOSTERONE: CPT

## 2021-12-15 PROCEDURE — 84403 ASSAY OF TOTAL TESTOSTERONE: CPT

## 2021-12-15 PROCEDURE — 85025 COMPLETE CBC W/AUTO DIFF WBC: CPT

## 2021-12-16 LAB
TESTOST FREE SERPL-MCNC: 13.8 PG/ML (ref 7.2–24)
TESTOST SERPL-MCNC: 161 NG/DL (ref 264–916)

## 2021-12-17 ENCOUNTER — TELEPHONE (OUTPATIENT)
Dept: UROLOGY | Facility: CLINIC | Age: 51
End: 2021-12-17

## 2022-01-04 ENCOUNTER — OFFICE VISIT (OUTPATIENT)
Dept: UROLOGY | Facility: CLINIC | Age: 52
End: 2022-01-04
Payer: COMMERCIAL

## 2022-01-04 VITALS
HEIGHT: 69 IN | BODY MASS INDEX: 29.33 KG/M2 | DIASTOLIC BLOOD PRESSURE: 82 MMHG | WEIGHT: 198 LBS | HEART RATE: 64 BPM | SYSTOLIC BLOOD PRESSURE: 108 MMHG

## 2022-01-04 DIAGNOSIS — E29.1 HYPOGONADISM IN MALE: Primary | ICD-10-CM

## 2022-01-04 PROCEDURE — 3008F BODY MASS INDEX DOCD: CPT | Performed by: PHYSICIAN ASSISTANT

## 2022-01-04 PROCEDURE — 99213 OFFICE O/P EST LOW 20 MIN: CPT | Performed by: PHYSICIAN ASSISTANT

## 2022-01-04 RX ORDER — TESTOSTERONE CYPIONATE 100 MG/ML
1 VIAL (ML) INTRAMUSCULAR WEEKLY
Qty: 10 ML | Refills: 1 | Status: SHIPPED | OUTPATIENT
Start: 2022-01-04 | End: 2022-03-24 | Stop reason: SDUPTHER

## 2022-01-04 RX ORDER — TESTOSTERONE CYPIONATE 100 MG/ML
1 VIAL (ML) INTRAMUSCULAR
Qty: 5 ML | Refills: 1 | Status: SHIPPED | OUTPATIENT
Start: 2022-01-04 | End: 2022-01-04

## 2022-01-04 RX ORDER — TESTOSTERONE CYPIONATE 100 MG/ML
INJECTION, SOLUTION INTRAMUSCULAR
COMMUNITY
Start: 2021-12-11 | End: 2022-01-04

## 2022-01-04 NOTE — PROGRESS NOTES
1/4/2022      Chief Complaint   Patient presents with    Follow-up         Assessment and Plan    46 y o  male     1  Low testosterone  - most recent testosterone 161, previously 76 and 65  - patient was started on testosterone replacement therapy in the form of testosterone cypionate 100 milligrams/milliliter IM injection every 2 weeks  - patient has normal FSH, LH, prolactin  - most recent PSA 0 4, ADELAIDA last visit normal  - improve symptoms, however patient still having some fatigue  - discussed increasing dose to 1 mL every week or 2 mL every 2 weeks  Patient states to prevent peak and troughs, he would prefer 1 mL every week injections  - new prescription for testosterone cypionate 100 milligram/milliliter injection every week sent to pharmacy as well as syringes  - will plan to follow-up in 6 weeks with testosterone and CBC labs prior to visit  - call with any questions or concerns in the meantime  - All questions answered; patient understands and agrees with plan      History of Present Illness  Julisa Lafleur is a 46 y o  male patient with history of low testosterone here for follow up  Patient was initially seen in consultation 09/28/2021 for low testosterone  Labs at that time showed total testosterone at 76 and 65  Patient had normal FSH, LH , prolactin levels  PSA at that time 0 4 and normal ADELAIDA  Patient was initiated on testosterone cypionate injection 100 milligram/milliliter every 2 weeks  Patient states that he has been feeling better with this dose, however is still having some fatigue  States that he does have a little more energy, however is still feeling tired especially at night  Patient denies any difficulties with injections and denies any side effects  Denies any new or worsening symptoms today  Review of Systems   Constitutional: Negative for activity change, appetite change, chills and fever  HENT: Negative for congestion and trouble swallowing      Respiratory: Negative for cough and shortness of breath  Cardiovascular: Negative for chest pain, palpitations and leg swelling  Gastrointestinal: Negative for abdominal pain, constipation, diarrhea, nausea and vomiting  Genitourinary: Negative for difficulty urinating, dysuria, flank pain, frequency, hematuria and urgency  Musculoskeletal: Negative for back pain and gait problem  Skin: Negative for wound  Allergic/Immunologic: Negative for immunocompromised state  Neurological: Negative for dizziness and syncope  Hematological: Does not bruise/bleed easily  Psychiatric/Behavioral: Negative for confusion  All other systems reviewed and are negative  Vitals  Vitals:    01/04/22 0800   BP: 108/82   Pulse: 64   Weight: 89 8 kg (198 lb)   Height: 5' 9" (1 753 m)       Physical Exam  Constitutional:       General: He is not in acute distress  Appearance: Normal appearance  He is not ill-appearing, toxic-appearing or diaphoretic  HENT:      Head: Normocephalic  Nose: No congestion  Eyes:      General: No scleral icterus  Right eye: No discharge  Left eye: No discharge  Conjunctiva/sclera: Conjunctivae normal       Pupils: Pupils are equal, round, and reactive to light  Pulmonary:      Effort: Pulmonary effort is normal    Musculoskeletal:      Cervical back: Normal range of motion  Skin:     General: Skin is warm and dry  Coloration: Skin is not jaundiced or pale  Findings: No bruising, erythema, lesion or rash  Neurological:      General: No focal deficit present  Mental Status: He is alert and oriented to person, place, and time  Mental status is at baseline  Gait: Gait normal    Psychiatric:         Mood and Affect: Mood normal          Behavior: Behavior normal          Thought Content:  Thought content normal          Judgment: Judgment normal            Past History  Past Medical History:   Diagnosis Date    Hypogonadism male      Social History Socioeconomic History    Marital status: /Civil Union     Spouse name: None    Number of children: None    Years of education: None    Highest education level: None   Occupational History    None   Tobacco Use    Smoking status: Current Some Day Smoker     Types: Cigars    Smokeless tobacco: Never Used   Vaping Use    Vaping Use: Never used   Substance and Sexual Activity    Alcohol use: Yes     Comment: rare, social      Drug use: No    Sexual activity: None   Other Topics Concern    None   Social History Narrative    None     Social Determinants of Health     Financial Resource Strain: Not on file   Food Insecurity: Not on file   Transportation Needs: Not on file   Physical Activity: Not on file   Stress: Not on file   Social Connections: Not on file   Intimate Partner Violence: Not on file   Housing Stability: Not on file     Social History     Tobacco Use   Smoking Status Current Some Day Smoker    Types: Cigars   Smokeless Tobacco Never Used     Family History   Problem Relation Age of Onset    Breast cancer Mother     Atrial fibrillation Father     Stomach cancer Paternal Uncle        The following portions of the patient's history were reviewed and updated as appropriate: allergies, current medications, past medical history, past social history, past surgical history and problem list     Results  No results found for this or any previous visit (from the past 1 hour(s)) ]  Lab Results   Component Value Date    PSA 0 4 10/04/2021    PSA 0 4 07/13/2021    PSA 0 4 02/09/2018     Lab Results   Component Value Date    CALCIUM 8 7 07/13/2021    K 4 0 07/13/2021    CO2 25 07/13/2021     07/13/2021    BUN 15 07/13/2021    CREATININE 1 18 07/13/2021     Lab Results   Component Value Date    WBC 5 85 12/15/2021    HGB 14 5 12/15/2021    HCT 43 8 12/15/2021     (H) 12/15/2021     12/15/2021       Fuad Galicia PA-C

## 2022-02-10 ENCOUNTER — APPOINTMENT (OUTPATIENT)
Dept: LAB | Facility: CLINIC | Age: 52
End: 2022-02-10
Payer: COMMERCIAL

## 2022-02-10 DIAGNOSIS — E29.1 HYPOGONADISM IN MALE: ICD-10-CM

## 2022-02-10 LAB
BASOPHILS # BLD AUTO: 0.04 THOUSANDS/ΜL (ref 0–0.1)
BASOPHILS NFR BLD AUTO: 1 % (ref 0–1)
EOSINOPHIL # BLD AUTO: 0.16 THOUSAND/ΜL (ref 0–0.61)
EOSINOPHIL NFR BLD AUTO: 3 % (ref 0–6)
ERYTHROCYTE [DISTWIDTH] IN BLOOD BY AUTOMATED COUNT: 13.2 % (ref 11.6–15.1)
HCT VFR BLD AUTO: 44.1 % (ref 36.5–49.3)
HGB BLD-MCNC: 15 G/DL (ref 12–17)
IMM GRANULOCYTES # BLD AUTO: 0.02 THOUSAND/UL (ref 0–0.2)
IMM GRANULOCYTES NFR BLD AUTO: 0 % (ref 0–2)
LYMPHOCYTES # BLD AUTO: 1.91 THOUSANDS/ΜL (ref 0.6–4.47)
LYMPHOCYTES NFR BLD AUTO: 38 % (ref 14–44)
MCH RBC QN AUTO: 33.3 PG (ref 26.8–34.3)
MCHC RBC AUTO-ENTMCNC: 34 G/DL (ref 31.4–37.4)
MCV RBC AUTO: 98 FL (ref 82–98)
MONOCYTES # BLD AUTO: 0.8 THOUSAND/ΜL (ref 0.17–1.22)
MONOCYTES NFR BLD AUTO: 16 % (ref 4–12)
NEUTROPHILS # BLD AUTO: 2.04 THOUSANDS/ΜL (ref 1.85–7.62)
NEUTS SEG NFR BLD AUTO: 42 % (ref 43–75)
NRBC BLD AUTO-RTO: 0 /100 WBCS
PLATELET # BLD AUTO: 223 THOUSANDS/UL (ref 149–390)
PMV BLD AUTO: 9.4 FL (ref 8.9–12.7)
RBC # BLD AUTO: 4.5 MILLION/UL (ref 3.88–5.62)
WBC # BLD AUTO: 4.97 THOUSAND/UL (ref 4.31–10.16)

## 2022-02-10 PROCEDURE — 36415 COLL VENOUS BLD VENIPUNCTURE: CPT

## 2022-02-10 PROCEDURE — 84402 ASSAY OF FREE TESTOSTERONE: CPT

## 2022-02-10 PROCEDURE — 84403 ASSAY OF TOTAL TESTOSTERONE: CPT

## 2022-02-10 PROCEDURE — 85025 COMPLETE CBC W/AUTO DIFF WBC: CPT

## 2022-02-11 LAB
TESTOST FREE SERPL-MCNC: 14.7 PG/ML (ref 7.2–24)
TESTOST SERPL-MCNC: 406 NG/DL (ref 264–916)

## 2022-02-22 ENCOUNTER — OFFICE VISIT (OUTPATIENT)
Dept: UROLOGY | Facility: CLINIC | Age: 52
End: 2022-02-22
Payer: COMMERCIAL

## 2022-02-22 VITALS
BODY MASS INDEX: 29.33 KG/M2 | WEIGHT: 198 LBS | HEIGHT: 69 IN | SYSTOLIC BLOOD PRESSURE: 120 MMHG | DIASTOLIC BLOOD PRESSURE: 92 MMHG

## 2022-02-22 DIAGNOSIS — E29.1 HYPOGONADISM IN MALE: Primary | ICD-10-CM

## 2022-02-22 PROCEDURE — 99213 OFFICE O/P EST LOW 20 MIN: CPT | Performed by: PHYSICIAN ASSISTANT

## 2022-02-22 PROCEDURE — 3008F BODY MASS INDEX DOCD: CPT | Performed by: PHYSICIAN ASSISTANT

## 2022-02-22 NOTE — PROGRESS NOTES
2/22/2022      Chief Complaint   Patient presents with    Low testosterone         Assessment and Plan    46 y o  male     1  Low testosterone  - most recent testosterone 406 (02/10/2022)  - CBC within normal limits  - patient currently using 1 mL testosterone cypionate injection 100 milligrams/milliliter with benefit  - denies any side effects  - will follow-up in 6 months with testosterone and CBC as well as a PSA prior to visit  - patient knows to go for labs between injections and prior to 8:00 a m   - call with any questions or concerns in the meantime  - All questions answered; patient understands and agrees with plan      History of Present Illness  Nicolas Winston is a 46 y o  male patient with history of low testosterone here for follow up  Patient was initially seen in consultation 09/28/2021 for low testosterone  Labs at that time showed testosterone at 68 and 65  Patient had normal FSH, LH, prolactin levels  PSA at that time was 0 4 and he had a normal ADELAIDA  Patient was initiated on testosterone cypionate injection 100 milligrams/milliliter, 1 mL every 2 weeks  Patient states that this did help somewhat, giving him more energy, however he is still feeling tired especially between doses  Patient was increased to 100 milligrams/milliliter, 1 mL injection every week  States he has been doing very well with this  Denies any issues with the injections were side effects from medication  Most recent testosterone 406, CBC within normal limits  Continues with fatigue and erectile dysfunction  Denies any new or worsening symptoms since last visit  Review of Systems   Constitutional: Negative for activity change, appetite change, chills and fever  HENT: Negative for congestion and trouble swallowing  Respiratory: Negative for cough and shortness of breath  Cardiovascular: Negative for chest pain, palpitations and leg swelling     Gastrointestinal: Negative for abdominal pain, constipation, diarrhea, nausea and vomiting  Genitourinary: Negative for difficulty urinating, dysuria, flank pain, frequency, hematuria and urgency  Musculoskeletal: Negative for back pain and gait problem  Skin: Negative for wound  Allergic/Immunologic: Negative for immunocompromised state  Neurological: Negative for dizziness and syncope  Hematological: Does not bruise/bleed easily  Psychiatric/Behavioral: Negative for confusion  All other systems reviewed and are negative  Vitals  Vitals:    02/22/22 0752   BP: 120/92   Weight: 89 8 kg (198 lb)   Height: 5' 9" (1 753 m)       Physical Exam  Constitutional:       General: He is not in acute distress  Appearance: Normal appearance  He is not ill-appearing, toxic-appearing or diaphoretic  HENT:      Head: Normocephalic  Nose: No congestion  Eyes:      General: No scleral icterus  Right eye: No discharge  Left eye: No discharge  Conjunctiva/sclera: Conjunctivae normal       Pupils: Pupils are equal, round, and reactive to light  Pulmonary:      Effort: Pulmonary effort is normal    Musculoskeletal:      Cervical back: Normal range of motion  Skin:     General: Skin is warm and dry  Coloration: Skin is not jaundiced or pale  Findings: No bruising, erythema, lesion or rash  Neurological:      General: No focal deficit present  Mental Status: He is alert and oriented to person, place, and time  Mental status is at baseline  Gait: Gait normal    Psychiatric:         Mood and Affect: Mood normal          Behavior: Behavior normal          Thought Content:  Thought content normal          Judgment: Judgment normal            Past History  Past Medical History:   Diagnosis Date    Hypogonadism male      Social History     Socioeconomic History    Marital status: /Civil Union     Spouse name: None    Number of children: None    Years of education: None    Highest education level: None   Occupational History    None   Tobacco Use    Smoking status: Current Some Day Smoker     Types: Cigars    Smokeless tobacco: Never Used   Vaping Use    Vaping Use: Never used   Substance and Sexual Activity    Alcohol use: Yes     Comment: rare, social      Drug use: No    Sexual activity: None   Other Topics Concern    None   Social History Narrative    None     Social Determinants of Health     Financial Resource Strain: Not on file   Food Insecurity: Not on file   Transportation Needs: Not on file   Physical Activity: Not on file   Stress: Not on file   Social Connections: Not on file   Intimate Partner Violence: Not on file   Housing Stability: Not on file     Social History     Tobacco Use   Smoking Status Current Some Day Smoker    Types: Cigars   Smokeless Tobacco Never Used     Family History   Problem Relation Age of Onset    Breast cancer Mother     Atrial fibrillation Father     Stomach cancer Paternal Uncle        The following portions of the patient's history were reviewed and updated as appropriate: allergies, current medications, past medical history, past social history, past surgical history and problem list     Results  No results found for this or any previous visit (from the past 1 hour(s)) ]  Lab Results   Component Value Date    PSA 0 4 10/04/2021    PSA 0 4 07/13/2021    PSA 0 4 02/09/2018     Lab Results   Component Value Date    CALCIUM 8 7 07/13/2021    K 4 0 07/13/2021    CO2 25 07/13/2021     07/13/2021    BUN 15 07/13/2021    CREATININE 1 18 07/13/2021     Lab Results   Component Value Date    WBC 4 97 02/10/2022    HGB 15 0 02/10/2022    HCT 44 1 02/10/2022    MCV 98 02/10/2022     02/10/2022       Parisa Issa PA-C

## 2022-03-24 ENCOUNTER — NURSE TRIAGE (OUTPATIENT)
Dept: OTHER | Facility: OTHER | Age: 52
End: 2022-03-24

## 2022-03-24 DIAGNOSIS — E29.1 HYPOGONADISM IN MALE: ICD-10-CM

## 2022-03-24 RX ORDER — TESTOSTERONE CYPIONATE 100 MG/ML
1 VIAL (ML) INTRAMUSCULAR WEEKLY
Qty: 10 ML | Refills: 0 | Status: CANCELLED | OUTPATIENT
Start: 2022-03-24

## 2022-03-24 RX ORDER — TESTOSTERONE CYPIONATE 100 MG/ML
1 VIAL (ML) INTRAMUSCULAR WEEKLY
Qty: 10 ML | Refills: 0 | Status: SHIPPED | OUTPATIENT
Start: 2022-03-24 | End: 2022-05-04

## 2022-03-24 NOTE — TELEPHONE ENCOUNTER
Regarding: injection reaction  ----- Message from Oscar Thomas RN sent at 3/24/2022  8:38 AM EDT -----  "I gave myself my testosterone this morning and have a small lump in my leg where I gave myself my injection   Is this normal?"

## 2022-03-24 NOTE — TELEPHONE ENCOUNTER
Reason for Disposition   Immunization reactions, questions about    Answer Assessment - Initial Assessment Questions  1  SYMPTOMS: "What is the main symptom?" (e g , redness, swelling, pain)       Swelling at the injection site at first and then rubbed it out and now it is fine   2  ONSET: "When was the vaccine (shot) given?" "How much later did the *No Answer* begin?" (e g , hours, days ago)      Testosterone   3  SEVERITY: "How bad is it?"       Soreness in thigh area where injection was given   4  FEVER: "Is there a fever?" If Yes, ask: "What is it, how was it measured, and when did it start?"      Denies  5  IMMUNIZATIONS GIVEN: "What shots have you recently received?"     Testosterone   6  PAST REACTIONS: "Have you reacted to immunizations before?" If Yes, ask: "What happened?"      Denies   7  OTHER SYMPTOMS: "Do you have any other symptoms?"   Denies      Protocols used: IMMUNIZATION REACTIONS-ADULT-[place]

## 2022-03-24 NOTE — TELEPHONE ENCOUNTER
Pt called in stating he gave himself his testosterone injection in his right thigh this morning and noticed a small lump formed after pulling the needle out  Pt rubbed the lump out and now he only has a soreness in his thigh  Pt wanted to make sure it wasn't a allergic reaction to the testosterone  Pt made aware it is most likely due to the injection not fully going into his muscle and leaking out into his tissue  Pt advised he did the right thing to massage the area to disperse the medication into his thigh  Pt stated he had a hard time injecting the medication today and agreed that he was pulling back on the needle while injecting it today  Pt given home care advise and signs and symptoms to look out for

## 2022-04-07 DIAGNOSIS — E29.1 HYPOGONADISM IN MALE: ICD-10-CM

## 2022-05-11 ENCOUNTER — OFFICE VISIT (OUTPATIENT)
Dept: FAMILY MEDICINE CLINIC | Facility: CLINIC | Age: 52
End: 2022-05-11

## 2022-05-11 VITALS
BODY MASS INDEX: 30.04 KG/M2 | TEMPERATURE: 98 F | OXYGEN SATURATION: 99 % | HEART RATE: 69 BPM | HEIGHT: 69 IN | WEIGHT: 202.8 LBS | DIASTOLIC BLOOD PRESSURE: 80 MMHG | SYSTOLIC BLOOD PRESSURE: 122 MMHG

## 2022-05-11 DIAGNOSIS — E29.1 TESTOSTERONE DEFICIENCY IN MALE: ICD-10-CM

## 2022-05-11 DIAGNOSIS — N63.20 BILATERAL BREAST LUMP: Primary | ICD-10-CM

## 2022-05-11 DIAGNOSIS — N63.10 BILATERAL BREAST LUMP: Primary | ICD-10-CM

## 2022-05-11 PROBLEM — U07.1 COVID-19: Status: RESOLVED | Noted: 2021-02-04 | Resolved: 2022-05-11

## 2022-05-11 PROCEDURE — 99213 OFFICE O/P EST LOW 20 MIN: CPT | Performed by: FAMILY MEDICINE

## 2022-05-11 NOTE — PROGRESS NOTES
Assessment/Plan:         Problem List Items Addressed This Visit        Other    Bilateral breast lump - Primary    Relevant Orders    US breast left limited (diagnostic)    US breast right limited (diagnostic)    Testosterone deficiency in male            Subjective:      Patient ID: Kenneth Taylor is a 45 y o  male  He states he has bilateral breast lumps for past 2 weeks  He is on Testosterone therapy since October and was concerned about breast cancer  The following portions of the patient's history were reviewed and updated as appropriate:   Past Medical History:  He has a past medical history of Hypogonadism male ,  _______________________________________________________________________  Medical Problems:  does not have any pertinent problems on file ,  _______________________________________________________________________  Past Surgical History:   has a past surgical history that includes Tonsillectomy  ,  _______________________________________________________________________  Family History:  family history includes Atrial fibrillation in his father; Breast cancer in his mother; Stomach cancer in his paternal uncle ,  _______________________________________________________________________  Social History:   reports that he has been smoking cigars  He has never used smokeless tobacco  He reports current alcohol use  He reports that he does not use drugs  ,  _______________________________________________________________________  Allergies:  is allergic to ampicillin     _______________________________________________________________________  Current Outpatient Medications   Medication Sig Dispense Refill    NEEDLE, DISP, 25 G 25G X 1-1/2" MISC Use every 14 (fourteen) days 60 each 0    Syringe/Needle, Disp, (SYRINGE 3CC/24LW4-7/2") 18G X 1-1/2" 3 ML MISC Use every 14 (fourteen) days 90 each 0    testosterone cypionate (DEPO-TESTOSTERONE) 100 mg/mL IM injection INJECT 1 ML AS DIRECTED ONCE A WEEK 10 mL 2     No current facility-administered medications for this visit      _______________________________________________________________________  Review of Systems   Skin:        Breast lumps         Objective:  Vitals:    05/11/22 0900   BP: 122/80   BP Location: Left arm   Patient Position: Sitting   Pulse: 69   Temp: 98 °F (36 7 °C)   TempSrc: Temporal   SpO2: 99%   Weight: 92 kg (202 lb 12 8 oz)   Height: 5' 9" (1 753 m)     Body mass index is 29 95 kg/m²  Physical Exam  Vitals and nursing note reviewed  Constitutional:       General: He is not in acute distress  Appearance: He is well-developed  He is not diaphoretic  HENT:      Head: Normocephalic and atraumatic  Pulmonary:      Effort: Pulmonary effort is normal  No respiratory distress  Chest:   Breasts:      Right: Swelling present  No bleeding, inverted nipple, mass, nipple discharge, skin change, tenderness, axillary adenopathy or supraclavicular adenopathy  Left: Swelling present  No bleeding, inverted nipple, mass, nipple discharge, skin change, tenderness, axillary adenopathy or supraclavicular adenopathy  Lymphadenopathy:      Upper Body:      Right upper body: No supraclavicular, axillary or pectoral adenopathy  Left upper body: Pectoral adenopathy present  No supraclavicular or axillary adenopathy  Neurological:      Mental Status: He is alert  Psychiatric:         Behavior: Behavior normal          Thought Content:  Thought content normal          Judgment: Judgment normal

## 2022-06-02 DIAGNOSIS — E29.1 HYPOGONADISM IN MALE: ICD-10-CM

## 2022-06-02 NOTE — TELEPHONE ENCOUNTER
Patient is requesting to double the quantity of his 25G needle prescription and he is out of needles for his next dose

## 2022-06-06 ENCOUNTER — APPOINTMENT (OUTPATIENT)
Dept: LAB | Facility: CLINIC | Age: 52
End: 2022-06-06
Payer: COMMERCIAL

## 2022-06-06 DIAGNOSIS — E29.1 HYPOGONADISM IN MALE: ICD-10-CM

## 2022-06-06 LAB
BASOPHILS # BLD AUTO: 0.03 THOUSANDS/ΜL (ref 0–0.1)
BASOPHILS NFR BLD AUTO: 1 % (ref 0–1)
EOSINOPHIL # BLD AUTO: 0.31 THOUSAND/ΜL (ref 0–0.61)
EOSINOPHIL NFR BLD AUTO: 6 % (ref 0–6)
ERYTHROCYTE [DISTWIDTH] IN BLOOD BY AUTOMATED COUNT: 13 % (ref 11.6–15.1)
HCT VFR BLD AUTO: 43.8 % (ref 36.5–49.3)
HGB BLD-MCNC: 15 G/DL (ref 12–17)
IMM GRANULOCYTES # BLD AUTO: 0 THOUSAND/UL (ref 0–0.2)
IMM GRANULOCYTES NFR BLD AUTO: 0 % (ref 0–2)
LYMPHOCYTES # BLD AUTO: 2.31 THOUSANDS/ΜL (ref 0.6–4.47)
LYMPHOCYTES NFR BLD AUTO: 45 % (ref 14–44)
MCH RBC QN AUTO: 33 PG (ref 26.8–34.3)
MCHC RBC AUTO-ENTMCNC: 34.2 G/DL (ref 31.4–37.4)
MCV RBC AUTO: 97 FL (ref 82–98)
MONOCYTES # BLD AUTO: 0.51 THOUSAND/ΜL (ref 0.17–1.22)
MONOCYTES NFR BLD AUTO: 10 % (ref 4–12)
NEUTROPHILS # BLD AUTO: 1.89 THOUSANDS/ΜL (ref 1.85–7.62)
NEUTS SEG NFR BLD AUTO: 38 % (ref 43–75)
NRBC BLD AUTO-RTO: 0 /100 WBCS
PLATELET # BLD AUTO: 181 THOUSANDS/UL (ref 149–390)
PMV BLD AUTO: 9.9 FL (ref 8.9–12.7)
PSA SERPL-MCNC: 1.3 NG/ML (ref 0–4)
RBC # BLD AUTO: 4.54 MILLION/UL (ref 3.88–5.62)
WBC # BLD AUTO: 5.05 THOUSAND/UL (ref 4.31–10.16)

## 2022-06-06 PROCEDURE — 36415 COLL VENOUS BLD VENIPUNCTURE: CPT

## 2022-06-06 PROCEDURE — 84153 ASSAY OF PSA TOTAL: CPT

## 2022-06-06 PROCEDURE — 84402 ASSAY OF FREE TESTOSTERONE: CPT

## 2022-06-06 PROCEDURE — 84403 ASSAY OF TOTAL TESTOSTERONE: CPT

## 2022-06-06 PROCEDURE — 85025 COMPLETE CBC W/AUTO DIFF WBC: CPT

## 2022-06-06 NOTE — PROGRESS NOTES
6/8/2022      No chief complaint on file  Assessment and Plan    45 y o  male     1  Low testosterone  - patient currently managed with 1 mL testosterone injections every week due to side effects he was having with peaks and troughs on and biweekly basis  States he feels as if his symptoms have improved greatly  - most recent testosterone testing 1051, previously 406  - we discussed the dangers of having testosterone elevated including polycythemia and risk for cardiovascular events  Patient states he is also having breast tissue enlargement  Would like to refer patient to endocrinology for input on testosterone dosing as well as side effects  - most recent CBC showing hematocrit hemoglobin within normal limits  - call with any questions or concerns in the meantime  - All questions answered; patient understands and agrees with plan    2  Routine Prostate Cancer Screening  - PSA 1 3, previously 0 4  - ADELAIDA due in September  - follow-up in September with repeat PSA prior to visit      History of Present Illness  Javier Pitts is a 45 y o  male patient with history of low testosterone here for follow up  Patient was initially seen in consultation 09/28/2021 for low testosterone  Labs at that time showed testosterone at 68 and 65  Patient had normal FSH, LH, prolactin levels  PSA at that time was 0 4 and he had a normal ADELAIDA  Patient was initiated on testosterone cypionate injection 100 milligrams/milliliter, 1 mL every 2 weeks  Patient states that this did help somewhat, giving him more energy, however he is still feeling tired especially between doses  Patient started 100 milligrams/milliliter, 1 mL injection every week  States he has been doing very well with this  States he is having some breast enlargement bilaterally and does have an ultrasound scheduled for this per his PCP  States his symptoms are pretty much controlled with this dosage    Most recent testosterone 1051, CBC within normal limits  Review of Systems   Constitutional: Negative for activity change, appetite change, chills and fever  HENT: Negative for congestion and trouble swallowing  Respiratory: Negative for cough and shortness of breath  Cardiovascular: Negative for chest pain, palpitations and leg swelling  Gastrointestinal: Negative for abdominal pain, constipation, diarrhea, nausea and vomiting  Genitourinary: Negative for difficulty urinating, dysuria, flank pain, frequency, hematuria and urgency  Musculoskeletal: Negative for back pain and gait problem  Skin: Negative for wound  Allergic/Immunologic: Negative for immunocompromised state  Neurological: Negative for dizziness and syncope  Hematological: Does not bruise/bleed easily  Psychiatric/Behavioral: Negative for confusion  All other systems reviewed and are negative  Vitals  Vitals:    06/08/22 0815   BP: 122/86   Pulse: 76   SpO2: 99%   Weight: 90 7 kg (200 lb)   Height: 5' 9" (1 753 m)       Physical Exam  Constitutional:       General: He is not in acute distress  Appearance: Normal appearance  He is not ill-appearing, toxic-appearing or diaphoretic  HENT:      Head: Normocephalic  Nose: No congestion  Eyes:      General: No scleral icterus  Right eye: No discharge  Left eye: No discharge  Conjunctiva/sclera: Conjunctivae normal       Pupils: Pupils are equal, round, and reactive to light  Pulmonary:      Effort: Pulmonary effort is normal    Musculoskeletal:      Cervical back: Normal range of motion  Skin:     General: Skin is warm and dry  Coloration: Skin is not jaundiced or pale  Findings: No bruising, erythema, lesion or rash  Neurological:      General: No focal deficit present  Mental Status: He is alert and oriented to person, place, and time  Mental status is at baseline        Gait: Gait normal    Psychiatric:         Mood and Affect: Mood normal          Behavior: Behavior normal          Thought Content:  Thought content normal          Judgment: Judgment normal            Past History  Past Medical History:   Diagnosis Date    Hypogonadism male      Social History     Socioeconomic History    Marital status: /Civil Union     Spouse name: None    Number of children: None    Years of education: None    Highest education level: None   Occupational History    None   Tobacco Use    Smoking status: Current Some Day Smoker     Types: Cigars    Smokeless tobacco: Never Used   Vaping Use    Vaping Use: Never used   Substance and Sexual Activity    Alcohol use: Yes     Comment: rare, social      Drug use: No    Sexual activity: None   Other Topics Concern    None   Social History Narrative    None     Social Determinants of Health     Financial Resource Strain: Not on file   Food Insecurity: Not on file   Transportation Needs: Not on file   Physical Activity: Not on file   Stress: Not on file   Social Connections: Not on file   Intimate Partner Violence: Not on file   Housing Stability: Not on file     Social History     Tobacco Use   Smoking Status Current Some Day Smoker    Types: Cigars   Smokeless Tobacco Never Used     Family History   Problem Relation Age of Onset    Breast cancer Mother     Atrial fibrillation Father     Stomach cancer Paternal Uncle        The following portions of the patient's history were reviewed and updated as appropriate: allergies, current medications, past medical history, past social history, past surgical history and problem list     Results  No results found for this or any previous visit (from the past 1 hour(s)) ]  Lab Results   Component Value Date    PSA 1 3 06/06/2022    PSA 0 4 10/04/2021    PSA 0 4 07/13/2021    PSA 0 4 02/09/2018     Lab Results   Component Value Date    CALCIUM 8 7 07/13/2021    K 4 0 07/13/2021    CO2 25 07/13/2021     07/13/2021    BUN 15 07/13/2021    CREATININE 1 18 07/13/2021     Lab Results   Component Value Date    WBC 5 05 06/06/2022    HGB 15 0 06/06/2022    HCT 43 8 06/06/2022    MCV 97 06/06/2022     06/06/2022       Ayaan Franco PA-C

## 2022-06-07 ENCOUNTER — TELEPHONE (OUTPATIENT)
Dept: OTHER | Facility: OTHER | Age: 52
End: 2022-06-07

## 2022-06-07 LAB
TESTOST FREE SERPL-MCNC: 22.1 PG/ML (ref 7.2–24)
TESTOST SERPL-MCNC: 1051 NG/DL (ref 264–916)

## 2022-06-08 ENCOUNTER — OFFICE VISIT (OUTPATIENT)
Dept: UROLOGY | Facility: CLINIC | Age: 52
End: 2022-06-08
Payer: COMMERCIAL

## 2022-06-08 VITALS
SYSTOLIC BLOOD PRESSURE: 122 MMHG | BODY MASS INDEX: 29.62 KG/M2 | DIASTOLIC BLOOD PRESSURE: 86 MMHG | HEIGHT: 69 IN | HEART RATE: 76 BPM | WEIGHT: 200 LBS | OXYGEN SATURATION: 99 %

## 2022-06-08 DIAGNOSIS — E29.1 HYPOGONADISM IN MALE: Primary | ICD-10-CM

## 2022-06-08 DIAGNOSIS — Z12.5 PROSTATE CANCER SCREENING: ICD-10-CM

## 2022-06-08 PROCEDURE — 3008F BODY MASS INDEX DOCD: CPT | Performed by: PHYSICIAN ASSISTANT

## 2022-06-08 PROCEDURE — 99213 OFFICE O/P EST LOW 20 MIN: CPT | Performed by: PHYSICIAN ASSISTANT

## 2022-08-02 ENCOUNTER — HOSPITAL ENCOUNTER (OUTPATIENT)
Dept: MAMMOGRAPHY | Facility: CLINIC | Age: 52
Discharge: HOME | End: 2022-08-02
Payer: COMMERCIAL

## 2022-08-02 ENCOUNTER — HOSPITAL ENCOUNTER (OUTPATIENT)
Dept: ULTRASOUND IMAGING | Facility: CLINIC | Age: 52
Discharge: HOME | End: 2022-08-02
Payer: COMMERCIAL

## 2022-08-02 VITALS — WEIGHT: 200 LBS | HEIGHT: 69 IN | BODY MASS INDEX: 29.62 KG/M2

## 2022-08-02 DIAGNOSIS — N63.0 LUMP OR MASS IN BREAST: ICD-10-CM

## 2022-08-02 PROCEDURE — 77066 DX MAMMO INCL CAD BI: CPT

## 2022-08-02 PROCEDURE — 76642 ULTRASOUND BREAST LIMITED: CPT

## 2022-08-02 PROCEDURE — G0279 TOMOSYNTHESIS, MAMMO: HCPCS

## 2022-08-18 DIAGNOSIS — E29.1 HYPOGONADISM IN MALE: ICD-10-CM

## 2022-08-18 NOTE — TELEPHONE ENCOUNTER
Medication Refill Request     Name Syringe/Needle, Disp, (SYRINGE 3CC/76CM0-5/2") 18G X 1-1/2" 3 ML MISC  Dose/Frequency Use every 14 (fourteen) days  Quantity 90 each  Verified pharmacy   [x]  Verified ordering Provider   [x]  Does patient have enough for the next 3 days?  Yes [] No [x]

## 2022-08-18 NOTE — TELEPHONE ENCOUNTER
Medication Refill Request     Name NEEDLE, DISP, 25 G 25G X 1-1/2" MISC  Dose/Frequency Use every 14 (fourteen) days  Quantity 60 each  Verified pharmacy   [x]  Verified ordering Provider   [x]  Does patient have enough for the next 3 days?  Yes [] No [x]

## 2022-08-24 DIAGNOSIS — E29.1 HYPOGONADISM IN MALE: ICD-10-CM

## 2022-08-24 RX ORDER — TESTOSTERONE CYPIONATE 100 MG/ML
INJECTION, SOLUTION INTRAMUSCULAR
Qty: 10 ML | Refills: 2 | Status: SHIPPED | OUTPATIENT
Start: 2022-08-24

## 2022-08-30 ENCOUNTER — CONSULT (OUTPATIENT)
Dept: ENDOCRINOLOGY | Age: 52
End: 2022-08-30
Payer: COMMERCIAL

## 2022-08-30 VITALS
DIASTOLIC BLOOD PRESSURE: 80 MMHG | BODY MASS INDEX: 28.58 KG/M2 | HEIGHT: 69 IN | OXYGEN SATURATION: 99 % | HEART RATE: 62 BPM | WEIGHT: 193 LBS | TEMPERATURE: 97.9 F | SYSTOLIC BLOOD PRESSURE: 110 MMHG

## 2022-08-30 DIAGNOSIS — E55.9 HYPOVITAMINOSIS D: Primary | ICD-10-CM

## 2022-08-30 DIAGNOSIS — E29.1 HYPOGONADISM IN MALE: ICD-10-CM

## 2022-08-30 PROCEDURE — 99204 OFFICE O/P NEW MOD 45 MIN: CPT | Performed by: INTERNAL MEDICINE

## 2022-08-30 RX ORDER — CELECOXIB 200 MG/1
CAPSULE ORAL DAILY
COMMUNITY

## 2022-08-30 NOTE — PROGRESS NOTES
Rhina Smalls 45 y o  male MRN: [id number]    Encounter: [id number]      Assessment/Plan     Assessment: This is a 45y o -year-old male with low serum testosterone  1  Low serum testosterone, his latest serum testosterone total measured in June 2022 indicates he is over supplemented "hypertestosteronism" can cause hyperlipidemia/ ASVD, TALI    He had been advised to check his levels 3 days after his injections  His initial lab data from October 2021 showed normal gonadotropin and serum prolactin levels at the time that his total and free serum testosterone were below normal range  no brain MRI obtained at the time  2-history of vitamin-D deficiency  3-"breast enlargement":  He is a recent ultrasound showed no gynecomastia but fatty tissue  Plan:  Proceed with diff following blood test 1 day before his testosterone injection  Serum total testosterone, SHBG,  TSH  25OHD  CMP    CC: low serum testosterone    History of Present Illness     HPI:  45year old man who presented to his PCP with few months history of extreme fatigue and tiredness  Among day blood test his serum testosterone and gonadotropins were measured  I see just 1 set of the testosterone results in the file  The results show that he has low serum testosterone with normal gonadotropin levels, he was advised to inject 100 mg testosterone cypionate weekly  He noticed improvement in his symptoms, fatigue and low libido  But noticed testicle size shrink age and breast enlargement  He denies having history of head or testicular trauma in the past   He has 2 children  He denies having headaches anosmia  He mentions although has had good morning erections  Review of Systems   Constitutional: Negative  HENT: Negative for trouble swallowing  No snoring   Eyes: Negative  Respiratory: Negative  Gastrointestinal: Negative  Endocrine: Negative  Genitourinary: Negative           No genital exam done but he has been seen initially by a urologist    Musculoskeletal: Negative  Neurological: Negative  Hematological: Does not bruise/bleed easily  Psychiatric/Behavioral: Negative  Historical Information   Past Medical History:   Diagnosis Date    Hypogonadism male      Past Surgical History:   Procedure Laterality Date    TONSILLECTOMY       Social History   Social History     Substance and Sexual Activity   Alcohol Use Yes    Comment: rare, social       Social History     Substance and Sexual Activity   Drug Use No     Social History     Tobacco Use   Smoking Status Current Some Day Smoker    Types: Cigars   Smokeless Tobacco Never Used     Family History:   Family History   Problem Relation Age of Onset    Breast cancer Mother     Atrial fibrillation Father     Stomach cancer Paternal Uncle        Meds/Allergies   Current Outpatient Medications   Medication Sig Dispense Refill    celecoxib (CeleBREX) 200 mg capsule Daily      NEEDLE, DISP, 25 G 25G X 1-1/2" MISC Use every 14 (fourteen) days 60 each 0    Syringe/Needle, Disp, (SYRINGE 3CC/29KB1-6/2") 18G X 1-1/2" 3 ML MISC Use every 14 (fourteen) days 90 each 0    testosterone cypionate (DEPO-TESTOSTERONE) 100 mg/mL IM injection INJECT 1 ML AS DIRECTED ONCE A WEEK 10 mL 2    SYRINGE-NEEDLE, DISP, 3 ML 18G X 1-1/2" 3 ML MISC BD Luer-Carla Syringe 3 mL 18 x 1 1/2"   USE EVERY 14 (FOURTEEN) DAYS (Patient not taking: Reported on 8/30/2022)       No current facility-administered medications for this visit  Allergies   Allergen Reactions    Ampicillin      nausea       Objective   Vitals: Blood pressure 110/80, pulse 62, temperature 97 9 °F (36 6 °C), height 5' 9" (1 753 m), weight 87 5 kg (193 lb), SpO2 99 %  Physical Exam  Constitutional:       Appearance: Normal appearance  He is normal weight  HENT:      Head: Normocephalic  Mouth/Throat:      Mouth: Mucous membranes are moist    Eyes:      General: No scleral icterus       Extraocular Movements: Extraocular movements intact  Pupils: Pupils are equal, round, and reactive to light  Neck:      Vascular: No carotid bruit  Cardiovascular:      Rate and Rhythm: Normal rate and regular rhythm  Heart sounds: Normal heart sounds  No murmur heard  Pulmonary:      Breath sounds: Normal breath sounds  No wheezing or rales  Abdominal:      General: Abdomen is flat  Palpations: Abdomen is soft  There is no mass  Tenderness: There is no abdominal tenderness  Genitourinary:     Comments: deferred  Musculoskeletal:      Right lower leg: Edema present  Left lower leg: No edema  Lymphadenopathy:      Cervical: No cervical adenopathy  Skin:     Coloration: Skin is not jaundiced  Findings: No bruising  Neurological:      General: No focal deficit present  Mental Status: He is alert and oriented to person, place, and time  Cranial Nerves: No cranial nerve deficit  Comments: Nl visual field by confrontation test   Psychiatric:         Mood and Affect: Mood normal          The history was obtained from the review of the chart, patient  Lab Results:   Lab Results   Component Value Date/Time    WBC 5 05 06/06/2022 07:34 AM    WBC 4 97 02/10/2022 07:18 AM    WBC 5 85 12/15/2021 07:32 AM    Hemoglobin 15 0 06/06/2022 07:34 AM    Hemoglobin 15 0 02/10/2022 07:18 AM    Hemoglobin 14 5 12/15/2021 07:32 AM    Hematocrit 43 8 06/06/2022 07:34 AM    Hematocrit 44 1 02/10/2022 07:18 AM    Hematocrit 43 8 12/15/2021 07:32 AM    MCV 97 06/06/2022 07:34 AM    MCV 98 02/10/2022 07:18 AM     (H) 12/15/2021 07:32 AM    Platelets 476 59/97/1179 07:34 AM    Platelets 529 03/95/3717 07:18 AM    Platelets 288 51/17/6984 07:32 AM           Imaging Studies: I have personally reviewed pertinent reports  Portions of the record may have been created with voice recognition software   Occasional wrong word or "sound a like" substitutions may have occurred due to the inherent limitations of voice recognition software  Read the chart carefully and recognize, using context, where substitutions have occurred

## 2022-09-14 ENCOUNTER — TELEPHONE (OUTPATIENT)
Dept: FAMILY MEDICINE CLINIC | Facility: CLINIC | Age: 52
End: 2022-09-14

## 2022-09-14 NOTE — TELEPHONE ENCOUNTER
Pt was bit by a tick yesterday  Today he has a bullseye kiara  No appts avail  Pt does not want to go to Urgent Care  Can you call in antibiotic?  Advise

## 2022-09-15 ENCOUNTER — OFFICE VISIT (OUTPATIENT)
Dept: FAMILY MEDICINE CLINIC | Facility: CLINIC | Age: 52
End: 2022-09-15
Payer: COMMERCIAL

## 2022-09-15 VITALS
HEART RATE: 59 BPM | BODY MASS INDEX: 28.73 KG/M2 | WEIGHT: 194 LBS | DIASTOLIC BLOOD PRESSURE: 90 MMHG | TEMPERATURE: 96.5 F | SYSTOLIC BLOOD PRESSURE: 134 MMHG | HEIGHT: 69 IN | OXYGEN SATURATION: 99 %
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
## 2022-09-15 DIAGNOSIS — Z23 NEED FOR TDAP VACCINATION: ICD-10-CM
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
## 2022-09-15 DIAGNOSIS — S70.362A TICK BITE OF LEFT THIGH, INITIAL ENCOUNTER: Primary | ICD-10-CM

## 2022-09-15 PROCEDURE — 90471 IMMUNIZATION ADMIN: CPT

## 2022-09-15 PROCEDURE — 90715 TDAP VACCINE 7 YRS/> IM: CPT

## 2022-09-15 PROCEDURE — 99213 OFFICE O/P EST LOW 20 MIN: CPT | Performed by: FAMILY MEDICINE

## 2022-09-15 RX ORDER — DOXYCYCLINE HYCLATE 100 MG/1
CAPSULE ORAL
Qty: 2 CAPSULE | Refills: 0 | Status: SHIPPED | OUTPATIENT
Start: 2022-09-15 | End: 2022-09-15

## 2022-09-15 NOTE — TELEPHONE ENCOUNTER
Pt cannot make today's appointment at  because of his work schedule  His wife states he does have a rash and bullseye, but no other symptoms at this time  She is very concerned about Lyme Disease  Pt can come in later if we have cancellations  Please advise, thank you!

## 2022-09-15 NOTE — TELEPHONE ENCOUNTER
I will send in prophylactic dose of doxycycline, then should be evaluated to see if this is truly a bulls eye rash   Please schedule tomorrow

## 2022-09-15 NOTE — PROGRESS NOTES
Marcus Umm Smalls 1970 male MRN: [id number]    Acute Visit        ASSESSMENT/PLAN  Problem List Items Addressed This Visit    None     Visit Diagnoses     Tick bite of left thigh, initial encounter    -  Primary    Relevant Orders    Lyme Antibody Profile with reflex to WB    Need for Tdap vaccination        Relevant Orders    TDAP VACCINE GREATER THAN OR EQUAL TO 6YO IM (Completed)          Will treat with prophylactic dose of doxycycline and test for Lyme in 2 weeks  Tdap updated      Future Appointments   Date Time Provider Bryce Manjarrez   10/11/2022  2:40 PM MD Daksha Wisdom   10/18/2022  8:00 AM Heriberto Salguero PA-C [facility]        SUBJECTIVE  CC: Thick bit (Happened 2 days ago, bullseye, left leg behind the knee)       He is here for a tick bite on the lower extremity  Pulled it off 2 days ago  Was engorged  Unsure if it was a deer tick  He is concerned about Lyme  He noticed a red [place] and thought he had a bulls eye rash  He would also like a Tdap - has not had one in several years and works around metal/nails/etc      [facility] Kayce is a 45 y o  male who presented for an acute visit complaining of  Review of Systems   Skin: Positive for wound (bite)         Historical Information   The patient history was reviewed as follows:  Past Medical History:   Diagnosis Date    Hypogonadism male      Past Surgical History:   Procedure Laterality Date    TONSILLECTOMY       Family History   Problem Relation Age of Onset    Breast cancer Mother     Atrial fibrillation Father     Stomach cancer Paternal Uncle       Social History   Social History     Substance and Sexual Activity   Alcohol Use Yes    Comment: rare, social       Social History     Substance and Sexual Activity   Drug Use No     Social History     Tobacco Use   Smoking Status Current Some Day Smoker    Types: Cigars   Smokeless Tobacco Never Used       Medications:   Meds/Allergies   Current Outpatient Medications   Medication Sig Dispense Refill    celecoxib (CeleBREX) 200 mg capsule Daily (Patient not taking: Reported on 9/15/2022)      doxycycline hyclate (VIBRAMYCIN) 100 mg capsule Take 2 caps once 2 capsule 0    NEEDLE, DISP, 25 G 25G X 1-1/2" MISC Use every 14 (fourteen) days 60 each 0    SYRINGE-NEEDLE, DISP, 3 ML 18G X 1-1/2" 3 ML MISC BD Luer-Carla Syringe 3 mL 18 x 1 1/2"   USE EVERY 14 (FOURTEEN) DAYS (Patient not taking: Reported on 8/30/2022)      Syringe/Needle, Disp, (SYRINGE 3CC/04QA1-5/2") 18G X 1-1/2" 3 ML MISC Use every 14 (fourteen) days 90 each 0    testosterone cypionate (DEPO-TESTOSTERONE) 100 mg/mL IM injection INJECT 1 ML AS DIRECTED ONCE A WEEK 10 mL 2     No current facility-administered medications for this visit  Allergies   Allergen Reactions    Ampicillin      nausea       OBJECTIVE  Vitals:   Vitals:    09/15/22 1145   BP: 134/90   BP Location: Left arm   Patient Position: Sitting   Cuff Size: Large   Pulse: 59   Temp: (!) 96 5 °F (35 8 °C)   SpO2: 99%   Weight: 88 kg (194 lb)   Height: 5' 9" (1 753 m)       Invasive Devices  Report    None                 Physical Exam  Vitals and nursing note reviewed  Constitutional:       General: He is not in acute distress  Appearance: He is well-developed  He is not diaphoretic  HENT:      Head: Normocephalic and atraumatic  Pulmonary:      Effort: Pulmonary effort is normal  No respiratory distress  Skin:     Comments: Insect bite on posterior leg leg, mildly erythematous and raised- local reaction  No erythema migrans   Neurological:      Mental Status: He is alert  Psychiatric:         Behavior: Behavior normal          Thought Content:  Thought content normal          Judgment: Judgment normal

## 2022-10-01 NOTE — TELEPHONE ENCOUNTER
Notified by [id number] that Testosterone Cypionate 100mg/mL is on BACKORDERED  Pharmacy is suggesting alternate drug substitution using Testosterone Cypionate 200mg/mL  Message forwarded back to the [facility] ([facility] AT [place] for further consideration

## 2022-10-17 ENCOUNTER — TELEPHONE (OUTPATIENT)
Dept: UROLOGY | Facility: CLINIC | Age: 52
End: 2022-10-17

## 2022-10-17 NOTE — TELEPHONE ENCOUNTER
Called and left VM for Pt about labs (PSA) PTV  Office number was left if Pt has any questions [phone]

## 2022-10-28 ENCOUNTER — TELEPHONE (OUTPATIENT)
Dept: OTHER | Facility: OTHER | Age: 52
End: 2022-10-28

## 2022-10-28 DIAGNOSIS — N52.9 ERECTILE DYSFUNCTION, UNSPECIFIED ERECTILE DYSFUNCTION TYPE: Primary | ICD-10-CM

## 2022-10-28 RX ORDER — SILDENAFIL 25 MG/1
25 TABLET, FILM COATED ORAL AS NEEDED
Qty: 30 TABLET | Refills: 0 | Status: SHIPPED | OUTPATIENT
Start: 2022-10-28 | End: 2023-01-06 | Stop reason: SDUPTHER

## 2022-10-28 NOTE — TELEPHONE ENCOUNTER
Returned call to patient   Patient is requesting [employer]        Pharmacy confirmed as:  [facility] in [place], [place]

## 2022-10-28 NOTE — TELEPHONE ENCOUNTER
Patient would like a call back from [facility] regarding a medication they had been discussing her prescribing for him ASAP

## 2022-11-02 ENCOUNTER — APPOINTMENT (OUTPATIENT)
Dept: LAB | Facility: CLINIC | Age: 52
End: 2022-11-02

## 2022-11-02 DIAGNOSIS — E29.1 HYPOGONADISM IN MALE: ICD-10-CM

## 2022-11-02 DIAGNOSIS — S70.362A TICK BITE OF LEFT THIGH, INITIAL ENCOUNTER: ICD-10-CM
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
## 2022-11-02 DIAGNOSIS — E55.9 HYPOVITAMINOSIS D: ICD-10-CM

## 2022-11-02 DIAGNOSIS — Z12.5 PROSTATE CANCER SCREENING: ICD-10-CM

## 2022-11-02 LAB
25(OH)D3 SERPL-MCNC: 23 NG/ML (ref 30–100)
ALBUMIN SERPL BCP-MCNC: 3.5 G/DL (ref 3.5–5)
ALP SERPL-CCNC: 41 U/L (ref 46–116)
ALT SERPL W P-5'-P-CCNC: 38 U/L (ref 12–78)
ANION GAP SERPL CALCULATED.3IONS-SCNC: 4 MMOL/L (ref 4–13)
AST SERPL W P-5'-P-CCNC: 19 U/L (ref 5–45)
BILIRUB SERPL-MCNC: 0.74 MG/DL (ref 0.2–1)
BUN SERPL-MCNC: 18 MG/DL (ref 5–25)
CALCIUM SERPL-MCNC: 8.2 MG/DL (ref 8.3–10.1)
CHLORIDE SERPL-SCNC: 111 MMOL/L (ref 96–108)
CO2 SERPL-SCNC: 26 MMOL/L (ref 21–32)
CREAT SERPL-MCNC: 0.98 MG/DL (ref 0.6–1.3)
FSH SERPL-ACNC: <0.2 MIU/ML (ref 0.7–10.8)
GFR SERPL CREATININE-BSD FRML MDRD: 87 ML/MIN/1.73SQ M
GLUCOSE P FAST SERPL-MCNC: 95 MG/DL (ref 65–99)
LH SERPL-ACNC: <0.2 MIU/ML (ref 1.2–10.6)
POTASSIUM SERPL-SCNC: 3.9 MMOL/L (ref 3.5–5.3)
PROT SERPL-MCNC: 6.7 G/DL (ref 6.4–8.4)
PSA SERPL-MCNC: 1 NG/ML (ref 0–4)
SODIUM SERPL-SCNC: 141 MMOL/L (ref 135–147)

## 2022-11-03 ENCOUNTER — TELEPHONE (OUTPATIENT)
Dept: OTHER | Facility: OTHER | Age: 52
End: 2022-11-03

## 2022-11-03 LAB
B BURGDOR IGG+IGM SER-ACNC: 0.3 AI
SHBG SERPL-SCNC: 31.9 NMOL/L (ref 19.3–76.4)
TESTOST FREE SERPL-MCNC: 10.1 PG/ML (ref 7.2–24)
TESTOST SERPL-MCNC: 426 NG/DL (ref 264–916)

## 2022-11-07 ENCOUNTER — OFFICE VISIT (OUTPATIENT)
Dept: UROLOGY | Facility: CLINIC | Age: 52
End: 2022-11-07

## 2022-11-07 VITALS
HEART RATE: 65 BPM | HEIGHT: 69 IN | SYSTOLIC BLOOD PRESSURE: 128 MMHG | WEIGHT: 195 LBS | BODY MASS INDEX: 28.88 KG/M2 | DIASTOLIC BLOOD PRESSURE: 76 MMHG | OXYGEN SATURATION: 99 %

## 2022-11-07 DIAGNOSIS — E29.1 HYPOGONADISM IN MALE: ICD-10-CM

## 2022-11-07 DIAGNOSIS — E34.9 ABNORMAL FSH LEVEL: Primary | ICD-10-CM

## 2022-11-07 RX ORDER — TESTOSTERONE CYPIONATE 100 MG/ML
100 INJECTION, SOLUTION INTRAMUSCULAR WEEKLY
Qty: 10 ML | Refills: 2 | Status: SHIPPED | OUTPATIENT
Start: 2022-11-07

## 2022-11-07 NOTE — PROGRESS NOTES
11/7/2022      Chief Complaint   Patient presents with   • Follow-up         Assessment and Plan    45 y o  male     1  Low testosterone  2  Abnormal FSH and LH   -most recent testosterone 426, previously 1051  -no updated CBC   -FSH and LH abnormal   -patient saw endocrinology for abnormal labs  -recommend patient follow-up with endocrinology for continuation of management  Patient would like a second opinion for endocrinology and I have placed a new referral for this  -follow-up with Urology in 6 months for symptom reassessment, labs prior  -call with any questions or concerns in the meantime   - All questions answered; patient understands and agrees with plan    3  Erectile dysfunction  - Using Viagra as needed with benefit      History of Present Illness  Alina Smalls is a 45 y o  male patient with history of low testosterone abnormal labs here for follow up  Patient seen in consultation for testosterone levels of 65 and 70  Workup with hormone levels at that time were normal  Patient was initiated on testosterone injections and has been doing well since  Testosterone was abnormally elevated earlier this year and patient has been injecting 0 75 mL instead of 1 mL weekly  Most recent testosterone level 426  States fatigue has greatly improved  Having intermittent difficulties with erections and takes Viagra at times  Overall happy with symptoms  Previously was advised to see endocrinology  Patient states he would like a second opinion  Repeat hormone levels showing abnormal FSH and LH  Prostate cancer screening up to date at this time  Due for ADELAIDA and PSA in 6 months  Denies family history of  malignancies  Review of Systems   Constitutional: Negative for activity change, appetite change, chills and fever  HENT: Negative for congestion and trouble swallowing  Respiratory: Negative for cough and shortness of breath  Cardiovascular: Negative for chest pain, palpitations and leg swelling  Gastrointestinal: Negative for abdominal pain, constipation, diarrhea, nausea and vomiting  Genitourinary: Negative for difficulty urinating, dysuria, flank pain, frequency, hematuria and urgency  Musculoskeletal: Negative for back pain and gait problem  Skin: Negative for wound  Allergic/Immunologic: Negative for immunocompromised state  Neurological: Negative for dizziness and syncope  Hematological: Does not bruise/bleed easily  Psychiatric/Behavioral: Negative for confusion  All other systems reviewed and are negative  AUA SYMPTOM SCORE    Flowsheet Row Most Recent Value   AUA SYMPTOM SCORE    How often have you had a sensation of not emptying your bladder completely after you finished urinating? 0 (P)     How often have you had to urinate again less than two hours after you finished urinating? 0 (P)     How often have you found you stopped and started again several times when you urinate? 0 (P)     How often have you found it difficult to postpone urination? 0 (P)     How often have you had a weak urinary stream? 0 (P)     How often have you had to push or strain to begin urination? 0 (P)     How many times did you most typically get up to urinate from the time you went to bed at night until the time you got up in the morning? 3 (P)     Quality of Life: If you were to spend the rest of your life with your urinary condition just the way it is now, how would you feel about that? 0 (P)     AUA SYMPTOM SCORE 3 (P)            Vitals  Vitals:    11/07/22 1012   BP: 128/76   Pulse: 65   SpO2: 99%   Weight: 88 5 kg (195 lb)   Height: 5' 9" (1 753 m)       Physical Exam  Constitutional:       General: He is not in acute distress  Appearance: Normal appearance  He is not ill-appearing, toxic-appearing or diaphoretic  HENT:      Head: Normocephalic  Nose: No congestion  Eyes:      General: No scleral icterus  Right eye: No discharge  Left eye: No discharge  Conjunctiva/sclera: Conjunctivae normal       Pupils: Pupils are equal, round, and reactive to light  Pulmonary:      Effort: Pulmonary effort is normal    Musculoskeletal:      Cervical back: Normal range of motion  Skin:     General: Skin is warm and dry  Coloration: Skin is not jaundiced or pale  Findings: No bruising, erythema, lesion or rash  Neurological:      General: No focal deficit present  Mental Status: He is alert and oriented to person, place, and time  Mental status is at baseline  Gait: Gait normal    Psychiatric:         Mood and Affect: Mood normal          Behavior: Behavior normal          Thought Content:  Thought content normal          Judgment: Judgment normal            Past History  Past Medical History:   Diagnosis Date   • Hypogonadism male      Social History     Socioeconomic History   • Marital status: /Civil Union     Spouse name: None   • Number of children: None   • Years of education: None   • Highest education level: None   Occupational History   • None   Tobacco Use   • Smoking status: Current Some Day Smoker     Years: 20 00     Types: Cigars   • Smokeless tobacco: Never Used   • Tobacco comment: Occasionally a cigar smoker   Vaping Use   • Vaping Use: Never used   Substance and Sexual Activity   • Alcohol use: Yes     Comment: Maybe a drink every other week   • Drug use: No   • Sexual activity: Yes     Partners: Female     Birth control/protection: None   Other Topics Concern   • None   Social History Narrative   • None     Social Determinants of Health     Financial Resource Strain: Not on file   Food Insecurity: Not on file   Transportation Needs: Not on file   Physical Activity: Not on file   Stress: Not on file   Social Connections: Not on file   Intimate Partner Violence: Not on file   Housing Stability: Not on file     Social History     Tobacco Use   Smoking Status Current Some Day Smoker   • Years: 20 00   • Types: Cigars   Smokeless Tobacco Never Used   Tobacco Comment    Occasionally a cigar smoker     Family History   Problem Relation Age of Onset   • Breast cancer Mother    • Cancer Mother    • Atrial fibrillation Father    • Stomach cancer Paternal Uncle        The following portions of the patient's history were reviewed and updated as appropriate: allergies, current medications, past medical history, past social history, past surgical history and problem list     Results  No results found for this or any previous visit (from the past 1 hour(s)) ]  Lab Results   Component Value Date    PSA 1 0 11/02/2022    PSA 1 3 06/06/2022    PSA 0 4 10/04/2021    PSA 0 4 07/13/2021     Lab Results   Component Value Date    CALCIUM 8 2 (L) 11/02/2022    K 3 9 11/02/2022    CO2 26 11/02/2022     (H) 11/02/2022    BUN 18 11/02/2022    CREATININE 0 98 11/02/2022     Lab Results   Component Value Date    WBC 5 05 06/06/2022    HGB 15 0 06/06/2022    HCT 43 8 06/06/2022    MCV 97 06/06/2022     06/06/2022       Raynald Cousin

## 2022-11-16 ENCOUNTER — TELEPHONE (OUTPATIENT)
Dept: OTHER | Facility: OTHER | Age: 52
End: 2022-11-16

## 2022-11-16 DIAGNOSIS — E29.1 HYPOGONADISM IN MALE: Primary | ICD-10-CM

## 2022-11-16 RX ORDER — TESTOSTERONE CYPIONATE 200 MG/ML
100 INJECTION, SOLUTION INTRAMUSCULAR WEEKLY
Qty: 10 ML | Refills: 1 | Status: SHIPPED | OUTPATIENT
Start: 2022-11-16 | End: 2023-03-22

## 2022-11-16 NOTE — TELEPHONE ENCOUNTER
Called and spoke to patient  Informed patient his usual testosterone cypionate is on backorder but we have placed a new script in the meantime  Patient thankful for call and verbalized understanding

## 2022-11-16 NOTE — TELEPHONE ENCOUNTER
Received a call from the Pharmacist [facility] stating testosterone cypionate 1000 is on back order  States Testosterone 2000 is still available  Requesting new script for patient medication

## 2023-01-06 DIAGNOSIS — N52.9 ERECTILE DYSFUNCTION, UNSPECIFIED ERECTILE DYSFUNCTION TYPE: ICD-10-CM

## 2023-01-06 RX ORDER — SILDENAFIL 25 MG/1
25 TABLET, FILM COATED ORAL AS NEEDED
Qty: 30 TABLET | Refills: 2 | Status: SHIPPED | OUTPATIENT
Start: 2023-01-06

## 2023-01-06 NOTE — TELEPHONE ENCOUNTER
Medication Refill Request     Name sildenafil (VIAGRA) 25 MG tablet  Dose/Frequency Take 1 tablet (25 mg total) by mouth as needed for erectile dysfunction   May take up to 4 tablets by mouth prior to intercourse if needed  Quantity 30  Verified pharmacy   [x]  Verified ordering Provider   [x]  Does patient have enough for the next 3 days?  Yes [] No [x]

## 2023-03-21 DIAGNOSIS — E29.1 HYPOGONADISM IN MALE: ICD-10-CM

## 2023-03-22 RX ORDER — TESTOSTERONE CYPIONATE 200 MG/ML
INJECTION, SOLUTION INTRAMUSCULAR
Qty: 10 ML | Refills: 1 | Status: SHIPPED | OUTPATIENT
Start: 2023-03-22

## 2023-03-30 DIAGNOSIS — E29.1 HYPOGONADISM IN MALE: ICD-10-CM

## 2023-03-30 RX ORDER — SYRINGE W-NEEDLE,DISPOSAB,3 ML 25GX5/8"
SYRINGE, EMPTY DISPOSABLE MISCELLANEOUS WEEKLY
Qty: 100 EACH | Refills: 1 | Status: SHIPPED | OUTPATIENT
Start: 2023-03-30 | End: 2023-06-08 | Stop reason: SDUPTHER

## 2023-03-30 RX ORDER — TESTOSTERONE CYPIONATE 100 MG/ML
100 INJECTION, SOLUTION INTRAMUSCULAR WEEKLY
Qty: 10 ML | Refills: 0 | OUTPATIENT
Start: 2023-03-30

## 2023-03-30 RX ORDER — SYRINGE W-NEEDLE,DISPOSAB,3 ML 25GX5/8"
SYRINGE, EMPTY DISPOSABLE MISCELLANEOUS
Qty: 30 EACH | Refills: 0 | Status: CANCELLED | OUTPATIENT
Start: 2023-03-30

## 2023-03-30 NOTE — TELEPHONE ENCOUNTER
Pt  Is requesting an increase in how many needles he gets since he needs to take it once a week  This way he doesn't have to keep calling to renew it so often

## 2023-03-30 NOTE — TELEPHONE ENCOUNTER
"Medication Refill Request     Name Syringe/Needle, Disp, (SYRINGE 3CC/03MN9-2/2\") 20G X 1-1/2\" 3 ML MISC  Dose/Frequency Use every 14 (fourteen) days  Quantity   Verified pharmacy   [x]  Verified ordering Provider   [x]  Does patient have enough for the next 3 days? Yes [x] No []    Medication Refill Request     Name testosterone cypionate (DEPO-TESTOSTERONE) 100 mg/mL IM injection  Dose/Frequency Inject 1 mL (100 mg total) into a muscle once a week  Quantity   Verified pharmacy   [x]  Verified ordering Provider   [x]  Does patient have enough for the next 3 days?  Yes [x] No []  "

## 2023-05-03 ENCOUNTER — APPOINTMENT (OUTPATIENT)
Dept: LAB | Facility: CLINIC | Age: 53
End: 2023-05-03

## 2023-05-03 DIAGNOSIS — E29.1 HYPOGONADISM IN MALE: ICD-10-CM

## 2023-05-03 LAB
BASOPHILS # BLD AUTO: 0.04 THOUSANDS/ÂΜL (ref 0–0.1)
BASOPHILS NFR BLD AUTO: 1 % (ref 0–1)
EOSINOPHIL # BLD AUTO: 0.25 THOUSAND/ÂΜL (ref 0–0.61)
EOSINOPHIL NFR BLD AUTO: 4 % (ref 0–6)
ERYTHROCYTE [DISTWIDTH] IN BLOOD BY AUTOMATED COUNT: 12.8 % (ref 11.6–15.1)
HCT VFR BLD AUTO: 45.6 % (ref 36.5–49.3)
HGB BLD-MCNC: 15.2 G/DL (ref 12–17)
IMM GRANULOCYTES # BLD AUTO: 0.01 THOUSAND/UL (ref 0–0.2)
IMM GRANULOCYTES NFR BLD AUTO: 0 % (ref 0–2)
LYMPHOCYTES # BLD AUTO: 2.77 THOUSANDS/ÂΜL (ref 0.6–4.47)
LYMPHOCYTES NFR BLD AUTO: 44 % (ref 14–44)
MCH RBC QN AUTO: 32.8 PG (ref 26.8–34.3)
MCHC RBC AUTO-ENTMCNC: 33.3 G/DL (ref 31.4–37.4)
MCV RBC AUTO: 99 FL (ref 82–98)
MONOCYTES # BLD AUTO: 0.75 THOUSAND/ÂΜL (ref 0.17–1.22)
MONOCYTES NFR BLD AUTO: 12 % (ref 4–12)
NEUTROPHILS # BLD AUTO: 2.42 THOUSANDS/ÂΜL (ref 1.85–7.62)
NEUTS SEG NFR BLD AUTO: 39 % (ref 43–75)
NRBC BLD AUTO-RTO: 0 /100 WBCS
PLATELET # BLD AUTO: 226 THOUSANDS/UL (ref 149–390)
PMV BLD AUTO: 9.6 FL (ref 8.9–12.7)
PSA SERPL-MCNC: 1.2 NG/ML (ref 0–4)
RBC # BLD AUTO: 4.63 MILLION/UL (ref 3.88–5.62)
WBC # BLD AUTO: 6.24 THOUSAND/UL (ref 4.31–10.16)

## 2023-05-04 LAB
TESTOST FREE SERPL-MCNC: 12.5 PG/ML (ref 7.2–24)
TESTOST SERPL-MCNC: 361 NG/DL (ref 264–916)

## 2023-05-05 NOTE — PROGRESS NOTES
5/8/2023      Chief Complaint   Patient presents with   • Follow-up         Assessment and Plan    52 y o  male     1  Low testosterone  - Testosterone 360   - PSA 1 2  - CBC within normal limits  - FSH and LH abnormal, however, endocrinology state that this is due to testosterone supplementation  - ADELAIDA today deferred  Will have this done in follow up  - Testosterone and CBC in 6 months  - Continue 0 5 mL injections weekly  - Call with any questions or concerns in the meantime  - All questions answered; patient understands and agrees with plan       History of Present Illness  Andrew Martinez is a 52 y o  male patient with history of low testosterone here for follow up  Patient seen in consultation for testosterone levels of 65 and 70  Workup with hormone levels at that time were normal  Patient was initiated on testosterone injections and has been doing well since  Testosterone was abnormally elevated earlier this year and patient has been injecting 0 5 mL instead of 1 mL weekly  Most recent testosterone level 360  States fatigue has greatly improved  Having intermittent difficulties with erections and takes Viagra at times  Overall happy with symptoms    Review of Systems   Constitutional: Negative for activity change, appetite change, chills and fever  HENT: Negative for congestion and trouble swallowing  Respiratory: Negative for cough and shortness of breath  Cardiovascular: Negative for chest pain, palpitations and leg swelling  Gastrointestinal: Negative for abdominal pain, constipation, diarrhea, nausea and vomiting  Genitourinary: Negative for difficulty urinating, dysuria, flank pain, frequency, hematuria and urgency  Musculoskeletal: Negative for back pain and gait problem  Skin: Negative for wound  Allergic/Immunologic: Negative for immunocompromised state  Neurological: Negative for dizziness and syncope  Hematological: Does not bruise/bleed easily     Psychiatric/Behavioral: "Negative for confusion  All other systems reviewed and are negative  AUA SYMPTOM SCORE    Flowsheet Row Most Recent Value   AUA SYMPTOM SCORE    How often have you had a sensation of not emptying your bladder completely after you finished urinating? 0 (P)     How often have you had to urinate again less than two hours after you finished urinating? 1 (P)     How often have you found you stopped and started again several times when you urinate? 0 (P)     How often have you found it difficult to postpone urination? 0 (P)     How often have you had a weak urinary stream? 0 (P)     How often have you had to push or strain to begin urination? 0 (P)     How many times did you most typically get up to urinate from the time you went to bed at night until the time you got up in the morning? 5 (P)     Quality of Life: If you were to spend the rest of your life with your urinary condition just the way it is now, how would you feel about that? 0 (P)     AUA SYMPTOM SCORE 6 (P)            Vitals  Vitals:    05/08/23 0808   BP: 124/80   Pulse: 63   SpO2: 99%   Weight: 92 5 kg (204 lb)   Height: 5' 9\" (1 753 m)       Physical Exam  Constitutional:       General: He is not in acute distress  Appearance: Normal appearance  He is not ill-appearing, toxic-appearing or diaphoretic  HENT:      Head: Normocephalic  Nose: No congestion  Eyes:      General: No scleral icterus  Right eye: No discharge  Left eye: No discharge  Conjunctiva/sclera: Conjunctivae normal       Pupils: Pupils are equal, round, and reactive to light  Pulmonary:      Effort: Pulmonary effort is normal    Genitourinary:     Comments: ADELAIDA today deferred  Musculoskeletal:      Cervical back: Normal range of motion  Skin:     General: Skin is warm and dry  Coloration: Skin is not jaundiced or pale  Findings: No bruising, erythema, lesion or rash  Neurological:      General: No focal deficit present        Mental " Status: He is alert and oriented to person, place, and time  Mental status is at baseline  Gait: Gait normal    Psychiatric:         Mood and Affect: Mood normal          Behavior: Behavior normal          Thought Content:  Thought content normal          Judgment: Judgment normal            Past History  Past Medical History:   Diagnosis Date   • Hypogonadism male      Social History     Socioeconomic History   • Marital status: /Civil Union     Spouse name: None   • Number of children: None   • Years of education: None   • Highest education level: None   Occupational History   • None   Tobacco Use   • Smoking status: Some Days     Types: Cigars     Passive exposure: Past   • Smokeless tobacco: Never   • Tobacco comments:     Occasionally a cigar smoker   Vaping Use   • Vaping Use: Never used   Substance and Sexual Activity   • Alcohol use: Yes     Comment: Maybe a drink every other week   • Drug use: No   • Sexual activity: Yes     Partners: Female     Birth control/protection: None   Other Topics Concern   • None   Social History Narrative   • None     Social Determinants of Health     Financial Resource Strain: Not on file   Food Insecurity: Not on file   Transportation Needs: Not on file   Physical Activity: Not on file   Stress: Not on file   Social Connections: Not on file   Intimate Partner Violence: Not on file   Housing Stability: Not on file     Social History     Tobacco Use   Smoking Status Some Days   • Types: Cigars   • Passive exposure: Past   Smokeless Tobacco Never   Tobacco Comments    Occasionally a cigar smoker     Family History   Problem Relation Age of Onset   • Breast cancer Mother    • Cancer Mother    • Atrial fibrillation Father    • Stomach cancer Paternal Uncle        The following portions of the patient's history were reviewed and updated as appropriate: allergies, current medications, past medical history, past social history, past surgical history and problem list     Results  No results found for this or any previous visit (from the past 1 hour(s)) ]  Lab Results   Component Value Date    PSA 1 2 05/03/2023    PSA 1 0 11/02/2022    PSA 1 3 06/06/2022    PSA 0 4 10/04/2021     Lab Results   Component Value Date    CALCIUM 8 2 (L) 11/02/2022    K 3 9 11/02/2022    CO2 26 11/02/2022     (H) 11/02/2022    BUN 18 11/02/2022    CREATININE 0 98 11/02/2022     Lab Results   Component Value Date    WBC 6 24 05/03/2023    HGB 15 2 05/03/2023    HCT 45 6 05/03/2023    MCV 99 (H) 05/03/2023     05/03/2023       Cam Jay PA-C

## 2023-05-08 ENCOUNTER — OFFICE VISIT (OUTPATIENT)
Dept: UROLOGY | Facility: CLINIC | Age: 53
End: 2023-05-08

## 2023-05-08 VITALS
SYSTOLIC BLOOD PRESSURE: 124 MMHG | DIASTOLIC BLOOD PRESSURE: 80 MMHG | WEIGHT: 204 LBS | BODY MASS INDEX: 30.21 KG/M2 | HEIGHT: 69 IN | OXYGEN SATURATION: 99 % | HEART RATE: 63 BPM

## 2023-05-08 DIAGNOSIS — E29.1 HYPOGONADISM IN MALE: Primary | ICD-10-CM

## 2023-05-19 ENCOUNTER — OFFICE VISIT (OUTPATIENT)
Age: 53
End: 2023-05-19

## 2023-05-19 VITALS
TEMPERATURE: 98.1 F | WEIGHT: 199 LBS | DIASTOLIC BLOOD PRESSURE: 78 MMHG | SYSTOLIC BLOOD PRESSURE: 133 MMHG | HEIGHT: 69 IN | BODY MASS INDEX: 29.47 KG/M2 | HEART RATE: 69 BPM | OXYGEN SATURATION: 100 % | RESPIRATION RATE: 18 BRPM

## 2023-05-19 DIAGNOSIS — J02.9 SORE THROAT: ICD-10-CM

## 2023-05-19 DIAGNOSIS — J06.9 UPPER RESPIRATORY TRACT INFECTION, UNSPECIFIED TYPE: Primary | ICD-10-CM

## 2023-05-19 LAB — S PYO AG THROAT QL: NEGATIVE

## 2023-05-19 NOTE — PATIENT INSTRUCTIONS
Strep test negative, COVID/flu results pending; will follow-up with results if positive  Continue over-the-counter products for symptoms: tylenol for fevers, ibuprofen for body aches, flonase (fluticasone) with nasal saline and sudafed-pe for nasal congestion, mucinex-dm for cough, and airborne/emergen-c for vitamin supplementation  Follow-up with PCP in 3-5 days  Report to ER if symptoms worsen

## 2023-05-19 NOTE — PROGRESS NOTES
[facility]        NAME: Hina Kerr is a 52 y o  male  : 1970    MRN: [id number]  DATE: May 19, 2023  TIME: 11:25 AM    Assessment and Plan   Upper respiratory tract infection, unspecified type [J06 9]  1  Upper respiratory tract infection, unspecified type        2  Sore throat  POCT rapid strepA    Covid/Flu-Office Collect        Strep test negative, no need to send throat culture per [facility] criteria  COVID/flu results pending  Patient Instructions     Strep test negative, COVID/flu results pending; will follow-up with results if positive  Continue over-the-counter products for symptoms: tylenol for fevers, ibuprofen for body aches, flonase (fluticasone) with nasal saline and sudafed-pe for nasal congestion, mucinex-dm for cough, and airborne/emergen-c for vitamin supplementation  Follow-up with PCP in 3-5 days  Report to ER if symptoms worsen  Chief Complaint     Chief Complaint   Patient presents with   • Sore Throat     Patient says yesterday he was fatigued, sore throat, left back pain, stomach problems, nausea  History of Present Illness       52year old male presents for evaluation of sore throat, headaches, left ear pain, body aches, and fatigue that started yesterday  Denies any known sick contacts or history of seasonal allergies  He has not had his flu immunization this year  He has taken excedrin for headache with some improvement of symptoms  Sore Throat   This is a new problem  The current episode started yesterday  The problem has been unchanged  The pain is worse on the left side  There has been no fever  The pain is at a severity of 5/10  The pain is moderate  Associated symptoms include congestion, ear pain and headaches  Pertinent negatives include no abdominal pain, coughing, diarrhea, drooling, ear discharge, hoarse voice, plugged ear sensation, neck pain, shortness of breath, stridor, swollen glands, trouble swallowing or vomiting   He has had no "exposure to strep or mono  He has tried NSAIDs and acetaminophen for the symptoms  The treatment provided mild relief  Review of Systems   Review of Systems   Constitutional: Positive for activity change, chills and fatigue  Negative for appetite change and fever  HENT: Positive for congestion, ear pain, postnasal drip, rhinorrhea and sore throat  Negative for drooling, ear discharge, hoarse voice, sinus pressure, sinus pain, sneezing and trouble swallowing  Respiratory: Negative for cough, chest tightness, shortness of breath and stridor  Cardiovascular: Negative for chest pain and palpitations  Gastrointestinal: Negative for abdominal pain, constipation, diarrhea, nausea and vomiting  Genitourinary: Negative for decreased urine volume, difficulty urinating and flank pain  Musculoskeletal: Positive for back pain (bilateral, lower) and myalgias  Negative for arthralgias and neck pain  Skin: Negative for color change and wound  Neurological: Positive for weakness and headaches  Negative for dizziness and light-headedness  Current Medications       Current Outpatient Medications:   •  celecoxib (CeleBREX) 200 mg capsule, Daily, Disp: , Rfl:   •  NEEDLE, DISP, 25 G 25G X 1-1/2\" MISC, Use every 14 (fourteen) days, Disp: 60 each, Rfl: 0  •  sildenafil (VIAGRA) 25 MG tablet, Take 1 tablet (25 mg total) by mouth as needed for erectile dysfunction for up to 30 doses   May take up to 4 tablets by mouth prior to intercourse if needed  , Disp: 30 tablet, Rfl: 2  •  Syringe/Needle, Disp, (SYRINGE 3CC/04KP0-7/2\") 20G X 1-1/2\" 3 ML MISC, Use once a week, Disp: 100 each, Rfl: 1  •  testosterone cypionate (DEPO-TESTOSTERONE) 100 mg/mL IM injection, Inject 1 mL (100 mg total) into a muscle once a week, Disp: 10 mL, Rfl: 2  •  testosterone cypionate (DEPO-TESTOSTERONE) 200 mg/mL SOLN, INJECT 1/2 ML (100 MG TOTAL) INTO A MUSCLE ONCE A WEEK (Patient taking differently: PT taking 100 mg), Disp: 10 mL, Rfl: " "1    Current Allergies     Allergies as of 05/19/2023 - Reviewed 05/19/2023   Allergen Reaction Noted   • Ampicillin  03/28/2000            The following portions of the patient's history were reviewed and updated as appropriate: allergies, current medications, past family history, past medical history, past social history, past surgical history and problem list      Past Medical History:   Diagnosis Date   • Hypogonadism male        Past Surgical History:   Procedure Laterality Date   • TONSILLECTOMY         Family History   Problem Relation Age of Onset   • Breast cancer Mother    • Cancer Mother    • Atrial fibrillation Father    • Stomach cancer Paternal Uncle          Medications have been verified  Objective   /78   Pulse 69   Temp 98 1 °F (36 7 °C)   Resp 18   Ht 5' 9\" (1 753 m)   Wt 90 3 kg (199 lb)   SpO2 100%   BMI 29 39 kg/m²        Physical Exam     Physical Exam  Vitals and nursing note reviewed  Constitutional:       Appearance: Normal appearance  He is well-developed and normal weight  HENT:      Head: Normocephalic and atraumatic  Right Ear: Hearing, tympanic membrane, ear canal and external ear normal  No middle ear effusion  Tympanic membrane is not perforated or erythematous  Left Ear: Hearing, tympanic membrane, ear canal and external ear normal   No middle ear effusion  Tympanic membrane is not perforated or erythematous  Nose: Congestion and rhinorrhea present  Rhinorrhea is clear  Right Turbinates: Not enlarged, swollen or pale  Left Turbinates: Not enlarged, swollen or pale  Right Sinus: No maxillary sinus tenderness or frontal sinus tenderness  Left Sinus: No maxillary sinus tenderness or frontal sinus tenderness  Mouth/Throat:      Lips: Pink  Mouth: Mucous membranes are moist  No oral lesions  Pharynx: Pharyngeal swelling and posterior oropharyngeal erythema present  No oropharyngeal exudate or uvula swelling        " Tonsils: No tonsillar exudate or tonsillar abscesses  2+ on the right  2+ on the left  Eyes:      Extraocular Movements:      Right eye: Normal extraocular motion  Conjunctiva/sclera: Conjunctivae normal       Pupils: Pupils are equal, round, and reactive to light  Cardiovascular:      Rate and Rhythm: Normal rate and regular rhythm  Heart sounds: Normal heart sounds  Pulmonary:      Effort: Pulmonary effort is normal       Breath sounds: Normal breath sounds  Musculoskeletal:      Cervical back: Full passive range of motion without pain, normal range of motion and neck supple  Lymphadenopathy:      Cervical: Cervical adenopathy present  Skin:     General: Skin is warm and dry  Capillary Refill: Capillary refill takes less than 2 seconds  Neurological:      General: No focal deficit present  Mental Status: He is oriented to person, place, and time  Psychiatric:         Mood and Affect: Mood normal          Behavior: Behavior normal  Behavior is cooperative

## 2023-05-22 LAB
FLUAV RNA RESP QL NAA+PROBE: NEGATIVE
FLUBV RNA RESP QL NAA+PROBE: NEGATIVE
SARS-COV-2 RNA RESP QL NAA+PROBE: NEGATIVE

## 2023-06-08 DIAGNOSIS — N52.9 ERECTILE DYSFUNCTION, UNSPECIFIED ERECTILE DYSFUNCTION TYPE: ICD-10-CM

## 2023-06-08 DIAGNOSIS — E29.1 HYPOGONADISM IN MALE: ICD-10-CM

## 2023-06-09 RX ORDER — SYRINGE W-NEEDLE,DISPOSAB,3 ML 25GX5/8"
SYRINGE, EMPTY DISPOSABLE MISCELLANEOUS WEEKLY
Qty: 100 EACH | Refills: 0 | Status: SHIPPED | OUTPATIENT
Start: 2023-06-09

## 2023-06-09 RX ORDER — SILDENAFIL 25 MG/1
25 TABLET, FILM COATED ORAL AS NEEDED
Qty: 30 TABLET | Refills: 0 | Status: SHIPPED | OUTPATIENT
Start: 2023-06-09

## 2023-06-09 RX ORDER — TESTOSTERONE CYPIONATE 200 MG/ML
100 INJECTION, SOLUTION INTRAMUSCULAR WEEKLY
Qty: 10 ML | Refills: 0 | Status: SHIPPED | OUTPATIENT
Start: 2023-06-09

## 2023-06-21 DIAGNOSIS — N52.9 ERECTILE DYSFUNCTION, UNSPECIFIED ERECTILE DYSFUNCTION TYPE: ICD-10-CM

## 2023-06-21 RX ORDER — SILDENAFIL 25 MG/1
25 TABLET, FILM COATED ORAL AS NEEDED
Qty: 30 TABLET | Refills: 2 | Status: SHIPPED | OUTPATIENT
Start: 2023-06-21

## 2023-07-05 ENCOUNTER — TELEPHONE (OUTPATIENT)
Dept: UROLOGY | Facility: AMBULATORY SURGERY CENTER | Age: 53
End: 2023-07-05

## 2023-07-05 DIAGNOSIS — E29.1 HYPOGONADISM IN MALE: ICD-10-CM

## 2023-07-05 RX ORDER — TESTOSTERONE CYPIONATE 200 MG/ML
100 INJECTION, SOLUTION INTRAMUSCULAR WEEKLY
Qty: 10 ML | Refills: 1 | Status: SHIPPED | OUTPATIENT
Start: 2023-07-05

## 2023-07-05 RX ORDER — SYRINGE W-NEEDLE,DISPOSAB,3 ML 25GX5/8"
SYRINGE, EMPTY DISPOSABLE MISCELLANEOUS WEEKLY
Qty: 100 EACH | Refills: 0 | Status: SHIPPED | OUTPATIENT
Start: 2023-07-05

## 2023-07-05 NOTE — TELEPHONE ENCOUNTER
Patient is calling to request a prescription refill.  Patient is completely out and has to take his next injection tomorrow    Last seen by: Anna Cates on 5/8/23 in [facility]     Medication:    · Testosterone Cypionate 200 mg/ml   · Syringe/needle, (3 cc/99MV1-9/2)  · Needle, DISP, 25 G X 1- 1/2      Pharmacy:  [facility] in [place]    30 / 90 day supply:  30    Pt can be reached at:  [phone]

## 2023-07-05 NOTE — TELEPHONE ENCOUNTER
Called and spoke to patient. Reviewed medication was sent to pharmacy. Patient verbalized understanding.

## 2023-09-07 DIAGNOSIS — E29.1 HYPOGONADISM IN MALE: ICD-10-CM

## 2023-09-07 RX ORDER — TESTOSTERONE CYPIONATE 200 MG/ML
100 INJECTION, SOLUTION INTRAMUSCULAR WEEKLY
Qty: 10 ML | Refills: 0 | Status: SHIPPED | OUTPATIENT
Start: 2023-09-07

## 2023-09-07 RX ORDER — SYRINGE W-NEEDLE,DISPOSAB,3 ML 25GX5/8"
SYRINGE, EMPTY DISPOSABLE MISCELLANEOUS WEEKLY
Qty: 100 EACH | Refills: 0 | Status: SHIPPED | OUTPATIENT
Start: 2023-09-07

## 2023-09-07 NOTE — TELEPHONE ENCOUNTER
Reason for call:   [x] Refill   [] Prior Auth  [] Other:     Office:   [] PCP/Provider -   [x] Speciality/Provider -  urology    Medication: Needles and testosterone    Dose:     Quantity:     Pharmacy: [facility] [place]    Does the patient have enough for 3 days? [x] Yes   [] No - Send as HP to POD    Is the patient having symptoms?    [x] No   [] Yes -

## 2023-09-19 DIAGNOSIS — E29.1 HYPOGONADISM IN MALE: ICD-10-CM

## 2023-09-19 NOTE — TELEPHONE ENCOUNTER
Reason for call:   [x] Refill   [] Prior Auth  [] Other:     Office:   [] PCP/Provider -   [x] Speciality/Provider - JOSUE Marin    Medication: NEEDLE, DISP, 25 G 25G X 1-1/2      Dose/Frequency: Use once a week    Quantity: #60    [facility] [address]    Does the patient have enough for 3 days? [] Yes   [x] No - Send as HP to POD    Patient called last week and the needles weren't called in. Needs them, completely out. Are not on active med list to que up.

## 2023-09-20 RX ORDER — SYRINGE W-NEEDLE,DISPOSAB,3 ML 25GX5/8"
SYRINGE, EMPTY DISPOSABLE MISCELLANEOUS WEEKLY
Qty: 100 EACH | Refills: 0 | OUTPATIENT
Start: 2023-09-20

## 2023-11-02 ENCOUNTER — TELEPHONE (OUTPATIENT)
Age: 53
End: 2023-11-02

## 2023-11-02 DIAGNOSIS — E29.1 HYPOGONADISM IN MALE: ICD-10-CM

## 2023-11-02 RX ORDER — SYRINGE W-NEEDLE,DISPOSAB,3 ML 25GX5/8"
SYRINGE, EMPTY DISPOSABLE MISCELLANEOUS WEEKLY
Qty: 100 EACH | Refills: 0 | Status: SHIPPED | OUTPATIENT
Start: 2023-11-02

## 2023-11-02 RX ORDER — TESTOSTERONE CYPIONATE 200 MG/ML
100 INJECTION, SOLUTION INTRAMUSCULAR WEEKLY
Qty: 10 ML | Refills: 0 | Status: SHIPPED | OUTPATIENT
Start: 2023-11-02

## 2023-11-02 NOTE — TELEPHONE ENCOUNTER
Patient called to ask if it is ok to still use his Testosterone injection that he opened on 09/21/23. He seems to remember being told that after 30 days you should discard. Please call the patient and advise.  [phone]

## 2023-11-02 NOTE — TELEPHONE ENCOUNTER
Reason for call:   [x] Refill   [] Prior Auth      [x] Other: Patient Also is requesting 25g needle that was discontinued 06/09/23  He uses the 20g syringe to to draw up the medication and then puts the 25g needle on the syringe to inject. He also requested 2 vials (20ML) of his testosterone at a time to save him trips to his pharmacy      Office:   [] PCP/Provider -   [x] Specialty/Provider - Urology Lizzie Caballero    Medication: Testosterone 200mg/ml / 20ml                      Syringe 3cc 20g    100                   Dose/Frequency:    Quantity:     Pharmacy: [facility]    Does the patient have enough for 3 days?    [x] Yes   [] No - Send as HP to POD

## 2023-11-06 NOTE — TELEPHONE ENCOUNTER
Called and spoke to the PT with Shalini Preciado recommendations. PT also confirmed appt for [place] on 11/8. PT also has to get labs done PTV. PT stated will get done tomorrow morning.

## 2023-11-07 ENCOUNTER — APPOINTMENT (OUTPATIENT)
Dept: LAB | Facility: HOSPITAL | Age: 53
End: 2023-11-07
Payer: COMMERCIAL

## 2023-11-07 DIAGNOSIS — E29.1 HYPOGONADISM IN MALE: ICD-10-CM

## 2023-11-07 LAB
BASOPHILS # BLD AUTO: 0.03 THOUSANDS/ÂΜL (ref 0–0.1)
BASOPHILS NFR BLD AUTO: 1 % (ref 0–1)
EOSINOPHIL # BLD AUTO: 0.32 THOUSAND/ÂΜL (ref 0–0.61)
EOSINOPHIL NFR BLD AUTO: 5 % (ref 0–6)
ERYTHROCYTE [DISTWIDTH] IN BLOOD BY AUTOMATED COUNT: 12.6 % (ref 11.6–15.1)
HCT VFR BLD AUTO: 41.9 % (ref 36.5–49.3)
HGB BLD-MCNC: 14.8 G/DL (ref 12–17)
IMM GRANULOCYTES # BLD AUTO: 0.01 THOUSAND/UL (ref 0–0.2)
IMM GRANULOCYTES NFR BLD AUTO: 0 % (ref 0–2)
LYMPHOCYTES # BLD AUTO: 2.05 THOUSANDS/ÂΜL (ref 0.6–4.47)
LYMPHOCYTES NFR BLD AUTO: 34 % (ref 14–44)
MCH RBC QN AUTO: 34.1 PG (ref 26.8–34.3)
MCHC RBC AUTO-ENTMCNC: 35.3 G/DL (ref 31.4–37.4)
MCV RBC AUTO: 97 FL (ref 82–98)
MONOCYTES # BLD AUTO: 0.93 THOUSAND/ÂΜL (ref 0.17–1.22)
MONOCYTES NFR BLD AUTO: 16 % (ref 4–12)
NEUTROPHILS # BLD AUTO: 2.67 THOUSANDS/ÂΜL (ref 1.85–7.62)
NEUTS SEG NFR BLD AUTO: 44 % (ref 43–75)
NRBC BLD AUTO-RTO: 0 /100 WBCS
PLATELET # BLD AUTO: 200 THOUSANDS/UL (ref 149–390)
PMV BLD AUTO: 9 FL (ref 8.9–12.7)
RBC # BLD AUTO: 4.34 MILLION/UL (ref 3.88–5.62)
WBC # BLD AUTO: 6.01 THOUSAND/UL (ref 4.31–10.16)

## 2023-11-07 PROCEDURE — 36415 COLL VENOUS BLD VENIPUNCTURE: CPT

## 2023-11-07 PROCEDURE — 84402 ASSAY OF FREE TESTOSTERONE: CPT

## 2023-11-07 PROCEDURE — 85025 COMPLETE CBC W/AUTO DIFF WBC: CPT

## 2023-11-07 PROCEDURE — 84403 ASSAY OF TOTAL TESTOSTERONE: CPT

## 2023-11-07 RX ORDER — NEEDLES, SAFETY 18GX1 1/2"
NEEDLE, DISPOSABLE MISCELLANEOUS WEEKLY
COMMUNITY
Start: 2023-09-19

## 2023-11-07 NOTE — PROGRESS NOTES
11/8/2023      Chief Complaint   Patient presents with    Hypogonadism         Assessment and Plan    52 y.o. male     1. Low testosterone   - Total testosterone (11/7/23) pending. Will call with results once finalized. Patient missed last dose, expect total testosterone to be low  - CBC (11/7/23) within normal limits   - PSA 1.2 (May 2023)  - ADELAIDA today benign  - FSH and LH abnormal, however, endocrinology state that this is due to testosterone supplementation   - PSA, Testosterone and CBC in 6 months  - Continue 0.5 mL injections weekly  - Call with any questions or concerns in the meantime  - All questions answered; patient understands and agrees with plan       History of Present Illness  Janay Cosby is a 52 y.o. male patient with history of low testosterone here for follow up. Patient seen in consultation for testosterone levels of 65 and 70. Workup with hormone levels at that time were normal. Patient was initiated on testosterone injections and has been doing well since. Testosterone was abnormally elevated earlier this year and patient has been injecting 0.5 mL instead of 1 mL weekly. Most recent testosterone pending. Patient states he missed last weeks dose. Having intermittent difficulties with erections and takes Viagra at times. Overall happy with symptoms     Review of Systems   Constitutional:  Negative for activity change, appetite change, chills and fever. HENT:  Negative for congestion and trouble swallowing. Respiratory:  Negative for cough and shortness of breath. Cardiovascular:  Negative for chest pain, palpitations and leg swelling. Gastrointestinal:  Negative for abdominal pain, constipation, diarrhea, nausea and vomiting. Genitourinary:  Negative for difficulty urinating, dysuria, flank pain, frequency, hematuria and urgency. Musculoskeletal:  Negative for back pain and gait problem. Skin:  Negative for wound. Allergic/Immunologic: Negative for immunocompromised state. Neurological:  Negative for dizziness and syncope. Hematological:  Does not bruise/bleed easily. Psychiatric/Behavioral:  Negative for confusion. All other systems reviewed and are negative. Vitals  Vitals:    11/08/23 0752   BP: 120/80   BP Location: Left arm   Patient Position: Sitting   Pulse: 66   SpO2: 98%   Weight: 90.7 kg (200 lb)   Height: 5' 9" (1.753 m)       Physical Exam  Constitutional:       General: He is not in acute distress. Appearance: Normal appearance. He is not ill-appearing, toxic-appearing or diaphoretic. HENT:      Head: Normocephalic. Nose: No congestion. Eyes:      General: No scleral icterus. Right eye: No discharge. Left eye: No discharge. Conjunctiva/sclera: Conjunctivae normal.      Pupils: Pupils are equal, round, and reactive to light. Pulmonary:      Effort: Pulmonary effort is normal.   Genitourinary:     Comments: Prostate smooth, symmetrical, no palpable nodules    Musculoskeletal:      Cervical back: Normal range of motion. Skin:     General: Skin is warm and dry. Coloration: Skin is not jaundiced or pale. Findings: No bruising, erythema, lesion or rash. Neurological:      General: No focal deficit present. Mental Status: He is alert and oriented to person, place, and time. Mental status is at baseline. Gait: Gait normal.   Psychiatric:         Mood and Affect: Mood normal.         Behavior: Behavior normal.         Thought Content:  Thought content normal.         Judgment: Judgment normal.           Past History  Past Medical History:   Diagnosis Date    Hypogonadism male      Social History     Socioeconomic History    Marital status: /Civil Union     Spouse name: None    Number of children: None    Years of education: None    Highest education level: None   Occupational History    None   Tobacco Use    Smoking status: Some Days     Types: Cigars     Passive exposure: Past    Smokeless tobacco: Never    Tobacco comments:     Occasionally a cigar smoker   Vaping Use    Vaping Use: Never used   Substance and Sexual Activity    Alcohol use: Yes     Comment: Maybe a drink every other week    Drug use: No    Sexual activity: Yes     Partners: Female     Birth control/protection: None   Other Topics Concern    None   Social History Narrative    None     Social Determinants of Health     Financial Resource Strain: Not on file   Food Insecurity: Not on file   Transportation Needs: Not on file   Physical Activity: Not on file   Stress: Not on file   Social Connections: Not on file   Intimate Partner Violence: Not on file   Housing Stability: Not on file     Social History     Tobacco Use   Smoking Status Some Days    Types: Cigars    Passive exposure: Past   Smokeless Tobacco Never   Tobacco Comments    Occasionally a cigar smoker     Family History   Problem Relation Age of Onset    Breast cancer Mother     Cancer Mother     Atrial fibrillation Father     Stomach cancer Paternal Uncle        The following portions of the patient's history were reviewed and updated as appropriate: allergies, current medications, past medical history, past social history, past surgical history and problem list.    Results  No results found for this or any previous visit (from the past 1 hour(s)).]  Lab Results   Component Value Date    PSA 1.2 05/03/2023    PSA 1.0 11/02/2022    PSA 1.3 06/06/2022    PSA 0.4 10/04/2021     Lab Results   Component Value Date    CALCIUM 8.2 (L) 11/02/2022    K 3.9 11/02/2022    CO2 26 11/02/2022     (H) 11/02/2022    BUN 18 11/02/2022    CREATININE 0.98 11/02/2022     Lab Results   Component Value Date    WBC 6.01 11/07/2023    HGB 14.8 11/07/2023    HCT 41.9 11/07/2023    MCV 97 11/07/2023     11/07/2023       Yennifer Fernández PA-C

## 2023-11-08 ENCOUNTER — OFFICE VISIT (OUTPATIENT)
Dept: UROLOGY | Facility: CLINIC | Age: 53
End: 2023-11-08
Payer: COMMERCIAL

## 2023-11-08 VITALS
SYSTOLIC BLOOD PRESSURE: 120 MMHG | DIASTOLIC BLOOD PRESSURE: 80 MMHG | BODY MASS INDEX: 29.62 KG/M2 | WEIGHT: 200 LBS | HEIGHT: 69 IN | OXYGEN SATURATION: 98 % | HEART RATE: 66 BPM

## 2023-11-08 DIAGNOSIS — N52.9 ERECTILE DYSFUNCTION, UNSPECIFIED ERECTILE DYSFUNCTION TYPE: ICD-10-CM

## 2023-11-08 DIAGNOSIS — E29.1 HYPOGONADISM IN MALE: Primary | ICD-10-CM

## 2023-11-08 PROCEDURE — 99213 OFFICE O/P EST LOW 20 MIN: CPT | Performed by: PHYSICIAN ASSISTANT

## 2023-11-08 RX ORDER — SILDENAFIL 25 MG/1
25 TABLET, FILM COATED ORAL AS NEEDED
Qty: 15 TABLET | Refills: 4 | Status: SHIPPED | OUTPATIENT
Start: 2023-11-08

## 2023-11-09 LAB
TESTOST FREE SERPL-MCNC: 6.1 PG/ML (ref 7.2–24)
TESTOST SERPL-MCNC: 187 NG/DL (ref 264–916)

## 2023-11-20 ENCOUNTER — APPOINTMENT (OUTPATIENT)
Dept: LAB | Facility: CLINIC | Age: 53
End: 2023-11-20
Payer: COMMERCIAL

## 2023-11-20 DIAGNOSIS — Z01.89 RADIOLOGICAL EXAMINATION, NOT ELSEWHERE CLASSIFIED: ICD-10-CM

## 2023-11-20 DIAGNOSIS — Z01.818 OTHER SPECIFIED PRE-OPERATIVE EXAMINATION: ICD-10-CM

## 2023-11-20 DIAGNOSIS — E29.1 HYPOGONADISM IN MALE: ICD-10-CM

## 2023-11-20 LAB
ALBUMIN SERPL BCP-MCNC: 4.1 G/DL (ref 3.5–5)
ALP SERPL-CCNC: 40 U/L (ref 34–104)
ALT SERPL W P-5'-P-CCNC: 21 U/L (ref 7–52)
ANION GAP SERPL CALCULATED.3IONS-SCNC: 8 MMOL/L
AST SERPL W P-5'-P-CCNC: 21 U/L (ref 13–39)
BILIRUB SERPL-MCNC: 0.63 MG/DL (ref 0.2–1)
BUN SERPL-MCNC: 22 MG/DL (ref 5–25)
CALCIUM SERPL-MCNC: 8.9 MG/DL (ref 8.4–10.2)
CHLORIDE SERPL-SCNC: 105 MMOL/L (ref 96–108)
CO2 SERPL-SCNC: 28 MMOL/L (ref 21–32)
CREAT SERPL-MCNC: 1.08 MG/DL (ref 0.6–1.3)
GFR SERPL CREATININE-BSD FRML MDRD: 76 ML/MIN/1.73SQ M
GLUCOSE P FAST SERPL-MCNC: 66 MG/DL (ref 65–99)
POTASSIUM SERPL-SCNC: 4 MMOL/L (ref 3.5–5.3)
PROT SERPL-MCNC: 6.7 G/DL (ref 6.4–8.4)
SODIUM SERPL-SCNC: 141 MMOL/L (ref 135–147)

## 2023-11-20 PROCEDURE — 80053 COMPREHEN METABOLIC PANEL: CPT

## 2023-11-20 PROCEDURE — 36415 COLL VENOUS BLD VENIPUNCTURE: CPT

## 2023-11-24 ENCOUNTER — OFFICE VISIT (OUTPATIENT)
Dept: FAMILY MEDICINE CLINIC | Facility: CLINIC | Age: 53
End: 2023-11-24
Payer: COMMERCIAL

## 2023-11-24 VITALS
SYSTOLIC BLOOD PRESSURE: 124 MMHG | BODY MASS INDEX: 30.27 KG/M2 | HEART RATE: 71 BPM | OXYGEN SATURATION: 97 % | DIASTOLIC BLOOD PRESSURE: 78 MMHG | HEIGHT: 69 IN | WEIGHT: 204.4 LBS | TEMPERATURE: 97.8 F

## 2023-11-24 DIAGNOSIS — M16.12 PRIMARY OSTEOARTHRITIS OF LEFT HIP: Primary | ICD-10-CM

## 2023-11-24 DIAGNOSIS — Z01.818 PRE-OP EXAMINATION: ICD-10-CM

## 2023-11-24 DIAGNOSIS — R09.82 POST-NASAL DRIP: ICD-10-CM

## 2023-11-24 DIAGNOSIS — R94.31 ABNORMAL EKG: ICD-10-CM

## 2023-11-24 DIAGNOSIS — E55.9 VITAMIN D DEFICIENCY: ICD-10-CM

## 2023-11-24 PROCEDURE — 99214 OFFICE O/P EST MOD 30 MIN: CPT | Performed by: FAMILY MEDICINE

## 2023-11-24 RX ORDER — ERGOCALCIFEROL 1.25 MG/1
50000 CAPSULE ORAL WEEKLY
Qty: 12 CAPSULE | Refills: 0 | Status: SHIPPED | OUTPATIENT
Start: 2023-11-24

## 2023-11-24 NOTE — PROGRESS NOTES
Niya Vallejoto 1970 male MRN: [id number]        ASSESSMENT/PLAN  Problem List Items Addressed This Visit        Musculoskeletal and Integument    Osteoarthritis of left hip - Primary       Other    Vitamin D deficiency    Relevant Medications    ergocalciferol (ERGOCALCIFEROL) 1.25 MG (73706 UT) capsule    Abnormal EKG     Patient will need an ECHO and cardiology evaluation         Relevant Orders    Echo complete w/ contrast if indicated    Ambulatory Referral to Cardiology    Post-nasal drip     Advised to try an OTC anti histamine        Other Visit Diagnoses     Pre-op examination        Relevant Orders    Ambulatory Referral to Cardiology          High Risk Surgery: no  CAD: no  CHF: no  CVD: no  DM2 on insulin: no  Cr>2: no        Jose David KIRSTEN Smalls is undergoing a Low Risk surgery. He has an abnormal EKG - ? LVH possibly related to testosterone use. Will need ECHO and cardiac evaluation. SUBJECTIVE  CC: Pre-op Exam (Left hip)      HPI:  Miroslava Barrientos is a 52 y.o. male who is planning to undergo left hip arthroplasty under  anesthesia  by Dr. Mateo Frias on 23/1/79. Patient has not had complications with anesthesia in the past.  Functional status: good    His EKG today is abnormal - ? LVH. He had a normal ECHO in 2021, but since that time has started testosterone treatment. Denies chest pain, shortness of breath, dizziness,edema. He also has a cough, slightly hoarse for past 2 weeks. No fever, chills, shortness of breath, or congestion. Also asking about having a Vit D supplement. Last Vit D level was low. Review of Systems   Constitutional:  Negative for activity change, appetite change, fatigue and unexpected weight change. Respiratory:  Positive for cough. Negative for chest tightness, shortness of breath and wheezing. Cardiovascular:  Negative for chest pain and leg swelling. Gastrointestinal:  Negative for abdominal pain. Musculoskeletal:  Positive for arthralgias.    Neurological: Negative for headaches. Historical Information   The patient history was reviewed as follows:    Past Medical History:   Diagnosis Date   • Hypogonadism male      Past Surgical History:   Procedure Laterality Date   • TONSILLECTOMY       Family History   Problem Relation Age of Onset   • Breast cancer Mother    • Cancer Mother    • Atrial fibrillation Father    • Stomach cancer Paternal Uncle       Social History       Medications:     Current Outpatient Medications:   •  BD ECLIPSE 25G X 1-1/2" MISC, once a week, Disp: , Rfl:   •  celecoxib (CeleBREX) 200 mg capsule, Daily, Disp: , Rfl:   •  ergocalciferol (ERGOCALCIFEROL) 1.25 MG (99398 UT) capsule, Take 1 capsule (50,000 Units total) by mouth once a week, Disp: 12 capsule, Rfl: 0  •  sildenafil (VIAGRA) 25 MG tablet, Take 1 tablet (25 mg total) by mouth as needed for erectile dysfunction for up to 30 doses . May take up to 4 tablets by mouth prior to intercourse if needed. , Disp: 15 tablet, Rfl: 4  •  Syringe/Needle, Disp, (SYRINGE 3CC/26GG0-1/2") 20G X 1-1/2" 3 ML MISC, Use once a week, Disp: 100 each, Rfl: 0  •  testosterone cypionate (DEPO-TESTOSTERONE) 200 mg/mL SOLN, Inject 0.5 mL (100 mg total) into a muscle once a week, Disp: 10 mL, Rfl: 0  Allergies   Allergen Reactions   • Ampicillin      nausea       OBJECTIVE    Vitals:   Vitals:    11/24/23 1512   BP: 124/78   BP Location: Left arm   Patient Position: Sitting   Pulse: 71   Temp: 97.8 °F (36.6 °C)   TempSrc: Temporal   SpO2: 97%   Weight: 92.7 kg (204 lb 6.4 oz)   Height: 5' 9" (1.753 m)           Physical Exam  Vitals and nursing note reviewed. Constitutional:       General: He is not in acute distress. Appearance: Normal appearance. He is well-developed. He is not diaphoretic. HENT:      Head: Normocephalic and atraumatic. Mouth/Throat:      Mouth: Mucous membranes are moist.      Pharynx: Oropharynx is clear.    Cardiovascular:      Rate and Rhythm: Normal rate and regular rhythm. Heart sounds: Normal heart sounds. No murmur heard. No friction rub. No gallop. Pulmonary:      Effort: Pulmonary effort is normal. No respiratory distress. Breath sounds: Normal breath sounds. No wheezing or rales. Chest:      Chest wall: No tenderness. Musculoskeletal:         General: No swelling. Right lower leg: No edema. Left lower leg: No edema. Neurological:      General: No focal deficit present. Mental Status: He is alert and oriented to person, place, and time. Psychiatric:         Mood and Affect: Mood normal.         Behavior: Behavior normal.         Thought Content: Thought content normal.         Judgment: Judgment normal.       EKG: there are no previous tracings available for comparison, LVH.       Raphael Gillette MD  [facility] [address] [facility]   11/24/2023  4:17 PM

## 2023-11-28 ENCOUNTER — EVALUATION (OUTPATIENT)
Dept: PHYSICAL THERAPY | Facility: CLINIC | Age: 53
End: 2023-11-28
Payer: COMMERCIAL

## 2023-11-28 DIAGNOSIS — M25.552 LEFT HIP PAIN: Primary | ICD-10-CM

## 2023-11-28 PROCEDURE — 97161 PT EVAL LOW COMPLEX 20 MIN: CPT

## 2023-11-28 PROCEDURE — 97110 THERAPEUTIC EXERCISES: CPT

## 2023-11-28 PROCEDURE — 97112 NEUROMUSCULAR REEDUCATION: CPT

## 2023-11-28 NOTE — LETTER
[id number]    Inderjit Rosales MD  [address] [place] [address]    Patient: Janay Cosby   YOB: 1970   Date of Visit: 2023     Encounter Diagnosis     ICD-10-CM    1. Left hip pain  M25.552           Dear Dr. Savage Grad: Thank you for your recent referral of Janay Cosby. Please review the attached evaluation summary from Jose David's recent visit. Please verify that you agree with the plan of care by signing the attached order. If you have any questions or concerns, please do not hesitate to call. I sincerely appreciate the opportunity to share in the care of one of your patients and hope to have another opportunity to work with you in the near future. Sincerely,    Theodore Mackay, PT, DPT      Referring Provider:      I certify that I have read the below Plan of Care and certify the need for these services furnished under this plan of treatment while under my care. Inderjit Rosales MD  [address]  Via Fax: [phone]          PT Evaluation     Today's date: 2023  Patient name: Janay Cosby  : 1970  MRN: [id number]  Referring provider: Inderjit Rosales MD  Dx:   Encounter Diagnosis     ICD-10-CM    1. Left hip pain  M25.552           Start Time: 1016  Stop Time: 105  Total time in clinic (min): 43 minutes    Assessment  Assessment details: Pt is a 52 y.o. male presenting to PT services for pre-operative visit as pt is scheduled for L LEXY on 23. PT reviewed home preparation checklist and post-operative expectations and prognosis. PT reviewed hip precautions, pt verbalized understanding. Pt has impaired L hip mobility and strength. PT added glute sets, quad sets, heel slides, LAQ, and supine hip abduction to pt's HEP, pt verbalized and demonstrated understanding of proper form.  Pt is a good candidate for skilled physical therapy in order to improve L hip mobility, decrease pain, and improve functional tolerance. Impairments: abnormal gait, abnormal or restricted ROM, activity intolerance, impaired balance, impaired physical strength, lacks appropriate home exercise program, pain with function and weight-bearing intolerance    Goals  GOALS TBD at post-operative visit. Plan  Patient would benefit from: skilled physical therapy  Planned modality interventions: biofeedback, TENS, cryotherapy and unattended electrical stimulation  Planned therapy interventions: IASTM, joint mobilization, kinesiology taping, manual therapy, massage, abdominal trunk stabilization, balance, nerve gliding, neuromuscular re-education, patient education, postural training, strengthening, stretching, therapeutic activities, therapeutic exercise, home exercise program, functional ROM exercises and flexibility  Frequency: 2x week  Duration in weeks: 8  Plan of Care beginning date: 11/28/2023  Plan of Care expiration date: 1/26/2024  Treatment plan discussed with: patient and family      Subjective Evaluation    History of Present Illness  Mechanism of injury: Pt reports that he started with hip pain about 7 years ago. He had pain bringing his shoe up to tie his shoe. He states he went to an orthopedic doctor who told him he would need a hip replacement. Then he brushed that off. Then recently, he went to [facility] and they gave him hip injections for a year. Then, he went to a surgeon who started him on Celebrex and he would have more good days than bad days, but that has now transitioned to more bad days than good days and his quality life decreased. 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 Patient Goals  Patient goals for therapy: decreased pain, improved balance, increased motion, return to sport/leisure activities and increased strength  Patient goal: "to give me the best outcome when I'm done and I want to be 100%.  I would like to get my socks back on without pain, and gain flexibility."  Pain  Current pain ratin  At best pain ratin  At worst pain ratin  Location: deep in L hip joint/groin  Quality: dull ache and throbbing  Alleviating factors: Celebrex, let it pass. Aggravating factors: stair climbing (pushing the knee in towards the motorcycle, torqueing the hip)    Social Support  Steps to enter house: yes (3 steps, no hand rails)  Stairs in house: yes (13 steps, 1 hand rail; FFSU available without shower)   Lives in: multiple-level home  Lives with: spouse (2 daughter (22 and 16years old, and 25year old daughter), 4 cats, 2 dogs)    Employment status: working (finacial advisor)  Hand dominance: left    Treatments  Previous treatment: injection treatment and medication        Objective     Active Range of Motion   Left Hip   Flexion: 83 degrees with pain  Abduction: 21 (stuck) degrees     Right Hip   Flexion: 103 degrees   Abduction: 36 degrees     Strength/Myotome Testing     Left Hip   Planes of Motion   Flexion: 4-  Extension: 5  Abduction: 4+    Right Hip   Planes of Motion   Flexion: 5  Extension: 5  Abduction: 5    Left Knee   Flexion: 4+  Extension: 4+    Right Knee   Flexion: 5  Extension: 5  Neuro Exam:     Functional outcomes   TU.8 c BUE, 7.48 no UE (seconds)         Precautions: None  Access Code: [email or web address]    POC expires Unit limit Auth Expiration date PT/OT/ST + Visit Limit?    23 3 23 BOMN                           Visit/Unit Tracking  AUTH Status:  Date               Not required Used 1               Remaining                      Date             Re-Eval             FOTO             Manuals                                                                 Neuro Re-Ed             [facility] [place] set 5"x20            Quad set  5"x20                                                                             Ther Ex             Bike             Hip abduction Supine 3x10            LAQ 3x10            Heel slides 10"x10 Ther Activity                                       Gait Training                                       Modalities

## 2023-11-28 NOTE — PROGRESS NOTES
PT Evaluation     Today's date: 2023  Patient name: Shaq Metz  : 1970  MRN: [id number]  Referring provider: Zulma Barrientos MD  Dx:   Encounter Diagnosis     ICD-10-CM    1. Left hip pain  M25.552           Start Time: 1016  Stop Time: 1059  Total time in clinic (min): 43 minutes    Assessment  Assessment details: Pt is a 52 y.o. male presenting to PT services for pre-operative visit as pt is scheduled for L LEXY on 23. PT reviewed home preparation checklist and post-operative expectations and prognosis. PT reviewed hip precautions, pt verbalized understanding. Pt has impaired L hip mobility and strength. PT added glute sets, quad sets, heel slides, LAQ, and supine hip abduction to pt's HEP, pt verbalized and demonstrated understanding of proper form. Pt is a good candidate for skilled physical therapy in order to improve L hip mobility, decrease pain, and improve functional tolerance. Impairments: abnormal gait, abnormal or restricted ROM, activity intolerance, impaired balance, impaired physical strength, lacks appropriate home exercise program, pain with function and weight-bearing intolerance    Goals  GOALS TBD at post-operative visit.     Plan  Patient would benefit from: skilled physical therapy  Planned modality interventions: biofeedback, TENS, cryotherapy and unattended electrical stimulation  Planned therapy interventions: IASTM, joint mobilization, kinesiology taping, manual therapy, massage, abdominal trunk stabilization, balance, nerve gliding, neuromuscular re-education, patient education, postural training, strengthening, stretching, therapeutic activities, therapeutic exercise, home exercise program, functional ROM exercises and flexibility  Frequency: 2x week  Duration in weeks: 8  Plan of Care beginning date: 2023  Plan of Care expiration date: 2024  Treatment plan discussed with: patient and family      Subjective Evaluation    History of Present Illness  Mechanism of injury: Pt reports that he started with hip pain about 7 years ago. He had pain bringing his shoe up to tie his shoe. He states he went to an orthopedic doctor who told him he would need a hip replacement. Then he brushed that off. Then recently, he went to [facility] and they gave him hip injections for a year. Then, he went to a surgeon who started him on Celebrex and he would have more good days than bad days, but that has now transitioned to more bad days than good days and his quality life decreased. 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 Patient Goals  Patient goals for therapy: decreased pain, improved balance, increased motion, return to sport/leisure activities and increased strength  Patient goal: "to give me the best outcome when I'm done and I want to be 100%. I would like to get my socks back on without pain, and gain flexibility."  Pain  Current pain ratin  At best pain ratin  At worst pain ratin  Location: deep in L hip joint/groin  Quality: dull ache and throbbing  Alleviating factors: Celebrex, let it pass.   Aggravating factors: stair climbing (pushing the knee in towards the motorcycle, torqueing the hip)    Social Support  Steps to enter house: yes (3 steps, no hand rails)  Stairs in house: yes (13 steps, 1 hand rail; FFSU available without shower)   Lives in: multiple-level home  Lives with: spouse (2 daughter (22 and 16years old, and 25year old daughter), 4 cats, 2 dogs)    Employment status: working (finacial advisor)  Hand dominance: left    Treatments  Previous treatment: injection treatment and medication        Objective     Active Range of Motion   Left Hip   Flexion: 83 degrees with pain  Abduction: 21 (stuck) degrees     Right Hip   Flexion: 103 degrees   Abduction: 36 degrees     Strength/Myotome Testing     Left Hip   Planes of Motion   Flexion: 4-  Extension: 5  Abduction: 4+    Right Hip   Planes of Motion Flexion: 5  Extension: 5  Abduction: 5    Left Knee   Flexion: 4+  Extension: 4+    Right Knee   Flexion: 5  Extension: 5  Neuro Exam:     Functional outcomes   TU.8 c BUE, 7.48 no UE (seconds)         Precautions: None  Access Code: [email or web address]    POC expires Unit limit Auth Expiration date PT/OT/ST + Visit Limit?    23 3 23 BOMN                           Visit/Unit Tracking  AUTH Status:  Date               Not required Used 1               Remaining                      Date             Re-Eval             FOTO             Manuals                                                                 Neuro Re-Ed             [facility] set Jeronimo-Nina set  5"x20                                                                             Ther Ex             Bike             Hip abduction Supine 3x10            LAQ 3x10            Heel slides 10"x10                                                                Ther Activity                                       Gait Training                                       Modalities

## 2023-11-29 ENCOUNTER — CONSULT (OUTPATIENT)
Dept: CARDIOLOGY CLINIC | Facility: CLINIC | Age: 53
End: 2023-11-29
Payer: COMMERCIAL

## 2023-11-29 VITALS
RESPIRATION RATE: 16 BRPM | OXYGEN SATURATION: 96 % | DIASTOLIC BLOOD PRESSURE: 80 MMHG | HEIGHT: 69 IN | SYSTOLIC BLOOD PRESSURE: 122 MMHG | HEART RATE: 66 BPM | WEIGHT: 204 LBS | BODY MASS INDEX: 30.21 KG/M2

## 2023-11-29 DIAGNOSIS — R94.31 ABNORMAL EKG: ICD-10-CM

## 2023-11-29 DIAGNOSIS — Z01.810 PREOP CARDIOVASCULAR EXAM: Primary | ICD-10-CM

## 2023-11-29 DIAGNOSIS — R00.2 INTERMITTENT PALPITATIONS: ICD-10-CM

## 2023-11-29 DIAGNOSIS — I35.1 NONRHEUMATIC AORTIC VALVE INSUFFICIENCY: ICD-10-CM

## 2023-11-29 PROCEDURE — 93000 ELECTROCARDIOGRAM COMPLETE: CPT | Performed by: INTERNAL MEDICINE

## 2023-11-29 PROCEDURE — 99244 OFF/OP CNSLTJ NEW/EST MOD 40: CPT | Performed by: INTERNAL MEDICINE

## 2023-11-29 NOTE — PROGRESS NOTES
CARDIOLOGY OFFICE VISIT  [facility]  [address] Myriam Morocho, [facility] [address] [facility] [place]  [address] [place] [address], [address] [place] [address]  Tel: [phone]      NAME: [facility] KIRSTEN Smalls  AGE: 52 y.o. SEX: male  : 1970  MRN: [id number]      Chief Complaint:  Chief Complaint   Patient presents with    New Patient Visit       History of Present Illness:   Patient referred by PCP for preoperative cardiac risk assessment prior to left hip surgery. An EKG done at her practice showed LVH and with patient being on testosterone she had concerns and ordered a cardiac risk assessment along and a 2D echocardiogram which has yet to be done    Patient states he is an active person and goes to the gym regularly. He denies chest pain / pressure, SOB, lightheadedness, syncope, swelling feet, orthopnea, PND, claudication. States very rarely he has felt some fluttering in his chest which lasts for a few seconds only and is not accompanied by any other symptoms and is only at rest.  States he has known of heart murmur since birth but does not remember any details about it    Patient denies history of hypertension, hyperlipidemia, CAD, DM type II, CKD, CHF, TIA, CVA  States he can easily climb a flight of steps without any issues. Smokes cigars socially.     Denies family history of premature CAD       Past Medical History:  Past Medical History:   Diagnosis Date    Hypogonadism male          Past Surgical History:  Past Surgical History:   Procedure Laterality Date    TONSILLECTOMY           Family History:  Family History   Problem Relation Age of Onset    Breast cancer Mother     Cancer Mother     Atrial fibrillation Father     Stomach cancer Paternal Uncle          Social History:  Social History     Socioeconomic History    Marital status: /Civil Union     Spouse name: None    Number of children: None    Years of education: None    Highest education level: None   Occupational History    None   Tobacco Use    Smoking status: Some Days     Types: Cigars     Passive exposure: Past    Smokeless tobacco: Never    Tobacco comments:     Occasionally a cigar smoker   Vaping Use    Vaping Use: Never used   Substance and Sexual Activity    Alcohol use:  Yes     Alcohol/week: 3.0 standard drinks of alcohol     Types: 1 Glasses of wine, 1 Cans of beer, 1 Shots of liquor per week     Comment: Maybe a drink every other week socially    Drug use: Never    Sexual activity: Yes     Partners: Female     Birth control/protection: None   Other Topics Concern    None   Social History Narrative    None     Social Determinants of Health     Financial Resource Strain: Not on file   Food Insecurity: Not on file   Transportation Needs: Not on file   Physical Activity: Not on file   Stress: Not on file   Social Connections: Not on file   Intimate Partner Violence: Not on file   Housing Stability: Not on file         Active Problems:  Patient Active Problem List   Diagnosis    Osteoarthritis of left hip    Fatigue    Lipid screening    Vitamin D deficiency    Benign paroxysmal positional vertigo    Colon cancer screening    Prostate cancer screening    Screening for diabetes mellitus    Arthralgia    Numbness and tingling of right arm    Bilateral breast lump    Testosterone deficiency in male    Abnormal EKG    Post-nasal drip         The following portions of the patient's history were reviewed and updated as appropriate: past medical history, past surgical history, past family history,  past social history, current medications, allergies and problem list.      Review of Systems:  Constitutional: Denies fever, chills  Eyes: Denies eye redness, eye discharge  ENT: Denies hearing loss, sneezing, nasal discharge, sore throat   Respiratory: Denies cough, expectoration, shortness of breath  Cardiovascular: Denies chest pain, lower extremity swelling  Gastrointestinal: Denies abdominal pain, nausea, vomiting, diarrhea  Genito-Urinary: Denies dysuria, incontinence  Musculoskeletal: Denies back pain, joint pain, muscle pain  Neurologic: Denies lightheadedness, syncope, headache, seizures  Endocrine: Denies polydipsia, temperature intolerance  Allergy and Immunology: Denies hives, insect bite sensitivity  Hematological and Lymphatic: Denies bleeding problems, swollen glands   Psychological: Denies depression, suicidal ideation, anxiety, panic  Dermatological: Denies pruritus, rash, skin lesion changes      Vitals:  Vitals:    11/29/23 1047   BP: 122/80   Pulse: 66   Resp: 16   SpO2: 96%       Body mass index is 30.13 kg/m². Weight (last 2 days)       Date/Time Weight    11/29/23 1047 92.5 (204)              Physical Examination:  General: Patient is not in acute distress. Awake, alert, oriented in time, place and person. Responding to commands  Head: Normocephalic. Atraumatic  Eyes: Both pupils normal sized, round and reactive to light. Nonicteric  ENT: Normal external ear canals  Neck: Supple. JVP not raised. Trachea central. No thyromegaly  Lungs: Bilateral bronchovascular breath sounds with no crackles or rhonchi  Chest wall: No tenderness  Cardiovascular: RRR. S1 and S2 normal. No murmur, rub or gallop  Gastrointestinal: Abdomen soft, nontender. No guarding or rigidity. Liver and spleen not palpable. Bowel sounds present  Neurologic: Patient is awake, alert, oriented in time, place and person. Responding to commands. Moving all extremities  Integumentary:  No skin rash  Lymphatic: No cervical lymphadenopathy  Back: Symmetric.  No CVA tenderness  Extremities: No clubbing, cyanosis or edema      Laboratory Results:  CBC with diff:   Lab Results   Component Value Date    WBC 5.76 11/20/2023    RBC 4.29 11/20/2023    HGB 14.5 11/20/2023    HCT 41.5 11/20/2023    MCV 97 11/20/2023    MCH 33.8 11/20/2023    RDW 12.7 11/20/2023     11/20/2023       CMP:  Lab Results   Component Value Date    CREATININE 1.08 11/20/2023    BUN 22 11/20/2023    BUN 18 02/09/2018    K 4.0 11/20/2023    K 4.3 02/09/2018     11/20/2023     02/09/2018    CO2 28 11/20/2023    CO2 25 02/09/2018    ALKPHOS 40 11/20/2023    ALT 21 11/20/2023    ALT 20 02/09/2018    AST 21 11/20/2023    AST 18 02/09/2018       Lipid Profile:   No results found for: "CHOL"  Lab Results   Component Value Date    HDL 32 (L) 07/13/2021    HDL 54 02/09/2018     Lab Results   Component Value Date    LDLCALC 134 (H) 07/13/2021    LDLCALC 86 02/09/2018     Lab Results   Component Value Date    TRIG 69 07/13/2021    TRIG 89 02/09/2018       EKG: Reviewed by me.  11/29/2023. Sinus bradycardia with first-degree AV block. 57 bpm      Medications:    Current Outpatient Medications:     BD ECLIPSE 25G X 1-1/2" MISC, once a week, Disp: , Rfl:     celecoxib (CeleBREX) 200 mg capsule, Daily, Disp: , Rfl:     ergocalciferol (ERGOCALCIFEROL) 1.25 MG (53447 UT) capsule, Take 1 capsule (50,000 Units total) by mouth once a week, Disp: 12 capsule, Rfl: 0    sildenafil (VIAGRA) 25 MG tablet, Take 1 tablet (25 mg total) by mouth as needed for erectile dysfunction for up to 30 doses . May take up to 4 tablets by mouth prior to intercourse if needed. , Disp: 15 tablet, Rfl: 4    Syringe/Needle, Disp, (SYRINGE 3CC/17GP1-9/2") 20G X 1-1/2" 3 ML MISC, Use once a week, Disp: 100 each, Rfl: 0    testosterone cypionate (DEPO-TESTOSTERONE) 200 mg/mL SOLN, Inject 0.5 mL (100 mg total) into a muscle once a week, Disp: 10 mL, Rfl: 0      Allergies: Allergies   Allergen Reactions    Ampicillin      nausea         Assessment and Plan:  1. Preop cardiovascular exam  2. Abnormal EKG  EKG done in the clinic reviewed with the patient and spouse. Await 2D echocardiogram report before giving full recommendations  - Ambulatory Referral to Cardiology    3.  Intermittent palpitations  Decrease intake of caffeine, tea, soda, alcohol, other stimulants  If you still have palpitations, please come back for an event monitor  Await 2D echocardiogram report     4. Nonrheumatic aortic valve insufficiency  Previous echocardiogram showed mild aortic insufficiency. Await current 2D echocardiogram report    Recommend aggressive risk factor modification and therapeutic lifestyle changes. Low-salt, low-calorie, low-fat, low-cholesterol diet with regular exercise and to optimize weight. I will defer the ordering and monitoring of necessity lab studies to you, but I am available and happy to review and manage any of the data at your request in the future. Discussed concepts of atherosclerosis, including signs and symptoms of cardiac disease. Previous studies were reviewed. Safety measures were reviewed. Questions were entertained and answered. Patient was advised to report any problems requiring medical attention. Follow-up with PCP and appropriate specialist and lab work as discussed. Return for follow up visit as scheduled or earlier, if needed. Thank you for allowing me to participate in the care and evaluation of your patient. Should you have any questions, please feel free to contact me.       Marquis Jacobs MD  11/29/2023,11:32 AM

## 2023-12-01 ENCOUNTER — HOSPITAL ENCOUNTER (OUTPATIENT)
Dept: NON INVASIVE DIAGNOSTICS | Facility: CLINIC | Age: 53
Discharge: HOME | End: 2023-12-01
Payer: COMMERCIAL

## 2023-12-01 ENCOUNTER — OFFICE VISIT (OUTPATIENT)
Dept: PHYSICAL THERAPY | Facility: CLINIC | Age: 53
End: 2023-12-01
Payer: COMMERCIAL

## 2023-12-01 VITALS
BODY MASS INDEX: 30.21 KG/M2 | SYSTOLIC BLOOD PRESSURE: 122 MMHG | DIASTOLIC BLOOD PRESSURE: 80 MMHG | WEIGHT: 204 LBS | HEIGHT: 69 IN | HEART RATE: 64 BPM

## 2023-12-01 DIAGNOSIS — R94.31 ABNORMAL EKG: ICD-10-CM

## 2023-12-01 DIAGNOSIS — M25.552 LEFT HIP PAIN: Primary | ICD-10-CM

## 2023-12-01 LAB
AORTIC ROOT: 2.9 CM
AORTIC VALVE MEAN VELOCITY: 13.9 M/S
APICAL FOUR CHAMBER EJECTION FRACTION: 62 %
ASCENDING AORTA: 3.1 CM
AV LVOT MEAN GRADIENT: 3 MMHG
AV LVOT PEAK GRADIENT: 6 MMHG
AV MEAN GRADIENT: 9 MMHG
AV PEAK GRADIENT: 18 MMHG
AV REGURGITATION PRESSURE HALF TIME: 824 MS
AV VELOCITY RATIO: 0.58
DOP CALC AO PEAK VEL: 2.12 M/S
DOP CALC AO VTI: 44.51 CM
DOP CALC LVOT PEAK VEL VTI: 24.59 CM
DOP CALC LVOT PEAK VEL: 1.24 M/S
E WAVE DECELERATION TIME: 164 MS
E/A RATIO: 1.15
FRACTIONAL SHORTENING: 30 (ref 28–44)
INTERVENTRICULAR SEPTUM IN DIASTOLE (PARASTERNAL SHORT AXIS VIEW): 0.8 CM
INTERVENTRICULAR SEPTUM: 0.8 CM (ref 0.6–1.1)
LAAS-AP2: 20.3 CM2
LAAS-AP4: 23.2 CM2
LEFT ATRIUM SIZE: 3.3 CM
LEFT ATRIUM VOLUME (MOD BIPLANE): 67 ML
LEFT ATRIUM VOLUME INDEX (MOD BIPLANE): 32.2 ML/M2
LEFT INTERNAL DIMENSION IN SYSTOLE: 3 CM (ref 2.1–4)
LEFT VENTRICULAR INTERNAL DIMENSION IN DIASTOLE: 4.3 CM (ref 3.5–6)
LEFT VENTRICULAR POSTERIOR WALL IN END DIASTOLE: 0.8 CM
LEFT VENTRICULAR STROKE VOLUME: 49 ML
LVSV (TEICH): 49 ML
MV E'TISSUE VEL-SEP: 10 CM/S
MV PEAK A VEL: 0.8 M/S
MV PEAK E VEL: 92 CM/S
RIGHT ATRIUM AREA SYSTOLE A4C: 14.8 CM2
RIGHT VENTRICLE ID DIMENSION: 3.3 CM
SL CV AV DECELERATION TIME RETROGRADE: 2842 MS
SL CV AV PEAK GRADIENT RETROGRADE: 68 MMHG
SL CV LEFT ATRIUM LENGTH A2C: 5.7 CM
SL CV LV EF: 59
SL CV PED ECHO LEFT VENTRICLE DIASTOLIC VOLUME (MOD BIPLANE) 2D: 84 ML
SL CV PED ECHO LEFT VENTRICLE SYSTOLIC VOLUME (MOD BIPLANE) 2D: 35 ML
TR MAX PG: 23 MMHG
TR PEAK VELOCITY: 2.4 M/S
TRICUSPID ANNULAR PLANE SYSTOLIC EXCURSION: 3.6 CM
TRICUSPID VALVE PEAK REGURGITATION VELOCITY: 2.37 M/S

## 2023-12-01 PROCEDURE — 97110 THERAPEUTIC EXERCISES: CPT

## 2023-12-01 PROCEDURE — 93306 TTE W/DOPPLER COMPLETE: CPT

## 2023-12-01 PROCEDURE — 93306 TTE W/DOPPLER COMPLETE: CPT | Performed by: INTERNAL MEDICINE

## 2023-12-01 NOTE — PROGRESS NOTES
Daily Note     Today's date: 2023  Patient name: Vignesh Andujar  : 1970  MRN: [id number]  Referring provider: Rosie Chapman MD  Dx:   Encounter Diagnosis     ICD-10-CM    1. Left hip pain  M25.552                      Subjective: Pt offers no new complaints upon arrival.  He reports he has been compliant with HEP at the gym. Objective: See treatment diary below      Assessment: Patient completes charted interventions within tolerance. He demonstrates limited ROM in all planes. Reviewed HEP with patient and added bridges, fire hydrants, LTR, and clamshells to HEP to be completed prior to surgery over the next week. Instructed pt to avoid clamshells, fire hydrants, and LTR post surgery. He demonstrates verbal understanding. Plan: Continue per plan of care. Precautions: None  Access Code: [email or web address]    POC expires Unit limit Auth Expiration date PT/OT/ST + Visit Limit?    23 3 23 BOMN                           Visit/Unit Tracking  AUTH Status:  Date              Not required Used 1 2              Remaining                      Date            Re-Eval             FOTO             Manuals                                                                 Neuro Re-Ed             Glute set 5"x20            Quad set  5"x20                                                                             Ther Ex             Bike  5' ROM           Hip abduction Supine 3x10            LAQ 3x10            Heel slides 10"x10            LTR  10"x10           clamshell  2x10           bridge  2x10           Fire hydrant  2x10           TG squat for ROM  5" 2x10           Stool IR/ER  x30           Ther Activity                                       Gait Training                                       Modalities

## 2023-12-06 DIAGNOSIS — E29.1 HYPOGONADISM IN MALE: ICD-10-CM

## 2023-12-06 RX ORDER — SYRINGE W-NEEDLE,DISPOSAB,3 ML 25GX5/8"
SYRINGE, EMPTY DISPOSABLE MISCELLANEOUS WEEKLY
Qty: 100 EACH | Refills: 0 | Status: SHIPPED | OUTPATIENT
Start: 2023-12-06

## 2023-12-06 RX ORDER — NEEDLES, SAFETY 18GX1 1/2"
NEEDLE, DISPOSABLE MISCELLANEOUS WEEKLY
Qty: 100 EACH | Refills: 0 | Status: SHIPPED | OUTPATIENT
Start: 2023-12-06

## 2023-12-06 NOTE — TELEPHONE ENCOUNTER
Reason for call: patient called in for medication refill  [x] Refill      Office:      [x] Specialty/Provider -  urology Ed CHAPO Boothe    Does the patient have enough for 3 days?    [] Yes   [x] No - Send as HP to POD patient reports one day left      Medication  Syringe/Needle, Disp, (SYRINGE 3CC/16ZU3-1/2") 20G X 1-1/2" 3 ML MISCFrequency: Weekly  Dispense Quantity: 100 each Sig: Use once a week  BD ECLIPSE 25G X 1-1/2" MISCDose:Frequency: Weekly    Pharmacy verified with patient on phone call  [place], [place]

## 2023-12-07 RX ORDER — TESTOSTERONE CYPIONATE 200 MG/ML
100 INJECTION, SOLUTION INTRAMUSCULAR WEEKLY
Qty: 10 ML | Refills: 0 | Status: SHIPPED | OUTPATIENT
Start: 2023-12-07

## 2023-12-11 ENCOUNTER — OFFICE VISIT (OUTPATIENT)
Dept: PHYSICAL THERAPY | Facility: CLINIC | Age: 53
End: 2023-12-11
Payer: COMMERCIAL

## 2023-12-11 DIAGNOSIS — M25.552 LEFT HIP PAIN: Primary | ICD-10-CM

## 2023-12-11 PROCEDURE — 97112 NEUROMUSCULAR REEDUCATION: CPT

## 2023-12-11 PROCEDURE — 97110 THERAPEUTIC EXERCISES: CPT

## 2023-12-11 NOTE — PROGRESS NOTES
Daily Note  Today's date: 2023  Patient name: Sergey Nielson  : 1970  MRN: [id number]  Referring provider: Dotty Zuniga MD  Dx:   Encounter Diagnosis     ICD-10-CM    1. Left hip pain  M25.552           Start Time: 153  Stop Time: 1610  Total time in clinic (min): 38 minutes    Assessment  Assessment details: Pt is a 52 y.o. male presenting to PT services s/p L LEXY on 23. Pt ambulates with RW and is considered a fall risk evidenced by score on TUG. Pt has good quadriceps activation. Pt has impaired hip strength and increased pain with hip flexion AROM. Pt's bandage remains intact and is dry with no excess drainage or erythema. PT reviewed hip precautions with pt and pt's wife, both verbalized understanding. Pt has impaired hip ROM, aligning with post-operative state. PT reviewed pre-operative HEP, pt verbalized and demonstrated understanding of proper form. Pt is a good candidate for skilled physical therapy in order to improve L hip mobility and strength, decrease pain, and improve functional tolerance. Impairments: abnormal gait, abnormal or restricted ROM, activity intolerance, impaired balance, impaired physical strength, pain with function and weight-bearing intolerance    Goals  STG (4 weeks):  1. Pt will improve L hip flexion strength to be at least 4+/5 without increase in pain   2. Pt will improve L hip abduction strength to be at least 4+/5   3. Pt will improve L hip flexion AROM to be at least 90*    LTG (8 weeks):  1. Pt will be independent in HEP  2. Pt will ambulate without AD   3. Pt will improve LLE strength to be 5/5   4.  Pt will be able to negotiate stairs without limitation     Plan  Patient would benefit from: skilled physical therapy  Planned modality interventions: biofeedback, TENS, cryotherapy and unattended electrical stimulation  Planned therapy interventions: IASTM, joint mobilization, kinesiology taping, manual therapy, massage, abdominal trunk stabilization, balance, nerve gliding, neuromuscular re-education, patient education, postural training, strengthening, stretching, therapeutic activities, therapeutic exercise, home exercise program, functional ROM exercises and flexibility  Frequency: 2x week  Duration in weeks: 8  Plan of Care beginning date: 2023  Plan of Care expiration date: 2024  Treatment plan discussed with: patient and family      Subjective Evaluation    History of Present Illness  Mechanism of injury: He states that when he walks it depends sometimes he has terrible pain and other times he has no pain. He states that when he tried to sleep in the bed he had some difficulty, so he has been sleeping in the recliner. He states that the stairs have been going well and showering has been fine. He states that he takes the oxy 3 times a day. Patient Goals  Patient goals for therapy: decreased pain, improved balance, increased motion, return to sport/leisure activities and increased strength  Patient goal: "to give me the best outcome when I'm done and I want to be 100%. I would like to get my socks back on without pain, and gain flexibility."  Pain  Current pain ratin  At best pain ratin  At worst pain ratin  Location: lateral L hip/incisional pain  Quality: throbbing and burning  Relieving factors: relaxation, medications and ice  Exacerbated by: getting in an out of car, STS, exercises.     Social Support  Steps to enter house: yes (3 steps, no hand rails)  Stairs in house: yes (13 steps, 1 hand rail; FFSU available without shower)   Lives in: multiple-level home  Lives with: spouse (2 daughter (22 and 16years old, and 25year old daughter), 4 cats, 2 dogs)    Employment status: working (finacial advisor)  Hand dominance: left    Treatments  Previous treatment: injection treatment and medication        Objective     Active Range of Motion   Left Hip   Flexion: 70 degrees   Abduction: 19 degrees     Right Hip Flexion: 103 degrees   Abduction: 36 degrees     Strength/Myotome Testing     Left Hip   Planes of Motion   Flexion: 3+ (pain)  Left hip extension strength: not assessed. Left hip abductors strength: not assessed. Right Hip   Planes of Motion   Flexion: 5  Extension: 5  Abduction: 5    Left Knee   Flexion: 4+  Extension: 4-    Right Knee   Flexion: 5  Extension: 5  Neuro Exam:     Functional outcomes   5x sit to stand: 9.12 c BUE (seconds)  TU.23 c RW (seconds)         Precautions: None  Access Code: [email or web address]    POC expires Unit limit Auth Expiration date PT/OT/ST + Visit Limit?    23 3 23 BOMN                           Visit/Unit Tracking  AUTH Status:  Date             Not required Used 1 2 3             Remaining                      Date           Re-Eval   1st post-op          FOTO             Manuals                                                                 Neuro Re-Ed             Glute set 5"x20  review          Quad set  5"x20  review                                                                           Ther Ex             Pt edu   SC  HEP, POC          PROM   NV          Bike  5' ROM           Hip abduction Supine 3x10  review          LAQ 3x10  review          Heel slides 10"x10  review          LTR  10"x10           clamshell  2x10           bridge  2x10           Fire hydrant  2x10           TG squat for ROM  5" 2x10           Stool IR/ER  x30           HR/TR   review                       Ther Activity             STS   X5 BUE           TUG   RW x1           Gait Training                                       Modalities

## 2023-12-14 ENCOUNTER — OFFICE VISIT (OUTPATIENT)
Dept: PHYSICAL THERAPY | Facility: CLINIC | Age: 53
End: 2023-12-14
Payer: COMMERCIAL

## 2023-12-14 DIAGNOSIS — M25.552 LEFT HIP PAIN: Primary | ICD-10-CM

## 2023-12-14 PROCEDURE — 97110 THERAPEUTIC EXERCISES: CPT

## 2023-12-14 NOTE — PROGRESS NOTES
Daily Note     Today's date: 2023  Patient name: Dayanara Gabriel  : 1970  MRN: [id number]  Referring provider: Flor Soliz MD  Dx:   Encounter Diagnosis     ICD-10-CM    1. Left hip pain  M25.552           Start Time: 1145  Stop Time: 1225  Total time in clinic (min): 40 minutes    Subjective: Pt reports that his pain is hit or miss, it depends on how much he does. But overall, he is feeling good. Objective: See treatment diary below      Assessment: Pt is able to perform all charted interventions well without increase in pain. Does require minimum assistance with SLR. Will monitor prolonged response to today's session and progress as able. Pt responds favorably to PROM with decreased pain. Pt will continue to benefit from skilled physical therapy in order to improve functional ability, decrease pain, and improve LLE strength. Plan: Continue per plan of care. Progress treatment as tolerated. Precautions: Posterior hip precautions  Access Code: [email or web address]    POC expires Unit limit Auth Expiration date PT/OT/ST + Visit Limit?    23 3 23 BOMN                           Visit/Unit Tracking  AUTH Status:  Date            Not required Used 1 2 3 4            Remaining                      Date          Re-Eval   1st post-op          FOTO             Manuals                                                                 Neuro Re-Ed             Glute set 5"x20  review          Quad set  5"x20  review                                                                           Ther Ex             Pt edu   SC  HEP, POC          PROM   NV SC         Bike  5' ROM  6' ROM         Hip abduction Supine 3x10  review S/l x10         LAQ 3x10  review 2# 3" 3x10         Heel slides 10"x10  review 10"x10 AROM         SLR    2x5 Josette         Hamstring curl             LTR  10"x10           clamshell  2x10           bridge  2x10           Fire hydrant  2x10 TG squat for ROM  5" 2x10           Stool IR/ER  x30           HR/TR   review                       Ther Activity             STS   X5 BUE           TUG   RW x1           Gait Training                                       Modalities

## 2023-12-19 ENCOUNTER — OFFICE VISIT (OUTPATIENT)
Dept: PHYSICAL THERAPY | Facility: CLINIC | Age: 53
End: 2023-12-19
Payer: COMMERCIAL

## 2023-12-19 DIAGNOSIS — M25.552 LEFT HIP PAIN: Primary | ICD-10-CM

## 2023-12-19 PROCEDURE — 97112 NEUROMUSCULAR REEDUCATION: CPT

## 2023-12-19 PROCEDURE — 97110 THERAPEUTIC EXERCISES: CPT
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
## 2023-12-21 ENCOUNTER — OFFICE VISIT (OUTPATIENT)
Dept: PHYSICAL THERAPY | Facility: CLINIC | Age: 53
End: 2023-12-21
Payer: COMMERCIAL

## 2023-12-21 DIAGNOSIS — M25.552 LEFT HIP PAIN: Primary | ICD-10-CM

## 2023-12-21 PROCEDURE — 97110 THERAPEUTIC EXERCISES: CPT

## 2023-12-21 PROCEDURE — 97112 NEUROMUSCULAR REEDUCATION: CPT

## 2023-12-21 NOTE — PROGRESS NOTES
"Daily Note     Today's date: 2023  Patient name: Jose David Smalls  : 1970  MRN: [id number]  Referring provider: Quinton Randall MD  Dx:   Encounter Diagnosis     ICD-10-CM    1. Left hip pain  M25.552           Start Time: 1142  Stop Time: 1229  Total time in clinic (min): 47 minutes    Subjective: Pt reports that he is feeling good. He states that he has had his normal post-operative soreness, but no increase in pain after last session.       Objective: See treatment diary below      Assessment: Pt has R weightshift while squatting, improved when using biofeedback on BioDex with repetition. Pt has increased pain in L hip flexors with SLR, tolerated with decreased repetitions. May add prone quad stretch NV. Pt will continue to benefit from skilled physical therapy in order to improve functional ability, decrease pain, and improve LLE strength.      Plan: Continue per plan of care.  Progress treatment as tolerated.       Precautions: Posterior hip precautions  Access Code: [email or web address]    POC expires Unit limit Auth Expiration date PT/OT/ST + Visit Limit?   23 3 23 BOMN                           Visit/Unit Tracking  AUTH Status:  Date          Not required Used 1 2 3 4 5 6          Remaining                      Date        Re-Eval   1st post-op          FOTO     NV NV       Manuals                                                                 Neuro Re-Ed             Glute set 5\"x20  review  Prone u/l 5\"x20 Prone u/l 5\"x20       Quad set  5\"x20  review  Prone 5\"x20 Prone 5\"x20       BioDex LOS      L12/11 x3       BioDex weightbearing % squat      2x10                                              Ther Ex             Pt edu   SC  HEP, POC          PROM   NV SC         Upright bike  5' ROM  6' ROM 5' ROM 6' ROM       Hip abduction Supine 3x10  review S/l x10 S/l 2x10 S/l 2x10       LAQ 3x10  review 2# 3\" 3x10 2# 3\" 3x10 4# 3\" 3x10    " "   Heel slides 10\"x10  review 10\"x10 AROM  X10 AROM       SLR    2x5 Josette 2x10 AROM  X10 AROM p!       Hip extension     2x10 prone 2x10 prone       Hamstring curl     2x10 prone 4# 2x12 prone       LTR  10\"x10           clamshell  2x10           bridge  2x10           Fire hydrant  2x10           TG squat for ROM  5\" 2x10   Stand PB 2x10        Stool IR/ER  x30           HR/TR   review                       Ther Activity             STS   X5 BUE   3x5 20\" box, eccentric focus 3x5 20\" box, ecc focus       TUG   RW x1           Gait Training                                       Modalities                                                 "

## 2023-12-22 DIAGNOSIS — E29.1 HYPOGONADISM IN MALE: ICD-10-CM

## 2023-12-26 ENCOUNTER — OFFICE VISIT (OUTPATIENT)
Dept: PHYSICAL THERAPY | Facility: CLINIC | Age: 53
End: 2023-12-26
Payer: COMMERCIAL

## 2023-12-26 DIAGNOSIS — M25.552 LEFT HIP PAIN: Primary | ICD-10-CM

## 2023-12-26 PROCEDURE — 97112 NEUROMUSCULAR REEDUCATION: CPT

## 2023-12-26 PROCEDURE — 97530 THERAPEUTIC ACTIVITIES: CPT

## 2023-12-26 PROCEDURE — 97110 THERAPEUTIC EXERCISES: CPT

## 2023-12-26 RX ORDER — SYRINGE, DISPOSABLE, 1 ML
SYRINGE, EMPTY DISPOSABLE MISCELLANEOUS WEEKLY
Qty: 25 EACH | Refills: 0 | Status: SHIPPED | OUTPATIENT
Start: 2023-12-26

## 2023-12-26 NOTE — PROGRESS NOTES
Daily Note     Today's date: 2023  Patient name: Jose David Smalls  : 1970  MRN: [id number]  Referring provider: Quinton Randall MD  Dx:   Encounter Diagnosis     ICD-10-CM    1. Left hip pain  M25.552           Start Time: 1145  Stop Time: 1225  Total time in clinic (min): 40 minutes    Subjective: Reports that he is doing well! Had some good family time over the holiday. Feels that he is putting more and more weight on the affected side and is trying to get back to normalized use. Feels that he is steady walking without the cane in the house like when he is making a cup of coffee.       Objective: See treatment diary below      Assessment: Tolerated treatment well. Continued encouragement for using cane for community level distance. Had solid 50/50 weightbearing throughout squat on both L and R side. Trialed resisted side stepping for further Wb'ing proximal glute med work - tolerated well with no pain. Did well with some gait training without AD during session - was steady with this activity with no buckling noted. Patient concurred to continue using AD in community, may begin trialing some activities at home without cane such as short distances - strongly encouraged having upper extremity assist nearby, patient verbalized good understanding. Patient demonstrated fatigue post treatment, exhibited good technique with therapeutic exercises, and would benefit from continued PT      Plan: Continue per plan of care.  Progress treatment as tolerated.       Precautions: Posterior hip precautions  Access Code: [email or web address]    POC expires Unit limit Auth Expiration date PT/OT/ST + Visit Limit?   23 3 23 BOMN                           Visit/Unit Tracking  AUTH Status:  Date         Not required Used 1 2 3 4 5 6 7         Remaining                      Date       Re-Eval   1st post-op          FOTO     NV NV       Manuals          "                                                        Neuro Re-Ed             Glute set 5\"x20  review  Prone u/l 5\"x20 Prone u/l 5\"x20 Prone u/l 5x20      Quad set  5\"x20  review  Prone 5\"x20 Prone 5\"x20 Prone 5\"x20      BioDex LOS      L12/11 x3 X3       BioDex weightbearing % squat      2x10 2x10       Side stepping        GTB 4x laps BUE                                 Ther Ex             Pt edu   SC  HEP, POC          PROM   NV SC         Upright bike  5' ROM  6' ROM 5' ROM 6' ROM 6' ROM       Hip abduction Supine 3x10  review S/l x10 S/l 2x10 S/l 2x10 S/l 2x10       LAQ 3x10  review 2# 3\" 3x10 2# 3\" 3x10 4# 3\" 3x10 4# 3x10x3\"      Heel slides 10\"x10  review 10\"x10 AROM  X10 AROM NT       SLR    2x5 Josette 2x10 AROM  X10 AROM p! 2x10       Hip extension     2x10 prone 2x10 prone 2x15 prone      Hamstring curl     2x10 prone 4# 2x12 prone 2x15 4# prone      LTR  10\"x10           clamshell  2x10           bridge  2x10           Fire hydrant  2x10           TG squat for ROM  5\" 2x10   Stand PB 2x10        Stool IR/ER  x30           HR/TR   review                       Ther Activity             STS   X5 BUE   3x5 20\" box, eccentric focus 3x5 20\" box, ecc focus 2x8 20\" ecc focus       Leg press        NV       TUG   RW x1           Gait Training             LRAD       3x laps around clinic                   Modalities                                                   "

## 2023-12-28 ENCOUNTER — OFFICE VISIT (OUTPATIENT)
Dept: PHYSICAL THERAPY | Facility: CLINIC | Age: 53
End: 2023-12-28
Payer: COMMERCIAL

## 2023-12-28 DIAGNOSIS — M25.552 LEFT HIP PAIN: Primary | ICD-10-CM

## 2023-12-28 PROCEDURE — 97112 NEUROMUSCULAR REEDUCATION: CPT

## 2023-12-28 PROCEDURE — 97110 THERAPEUTIC EXERCISES: CPT

## 2023-12-28 NOTE — PROGRESS NOTES
"Daily Note     Today's date: 2023  Patient name: Jose David Smalls  : 1970  MRN: [id number]  Referring provider: Quinton Randall MD  Dx:   Encounter Diagnosis     ICD-10-CM    1. Left hip pain  M25.552           Start Time: 1144  Stop Time: 1232  Total time in clinic (min): 48 minutes    Subjective: Pt reports that he had his follow up with his surgeon who cleared him to discharge hip precautions and begin driving. He states that he is doing very well and hasn't been using his cane all the time, but he does still have some buckling of his knee       Objective: See treatment diary below      Assessment: Pt is able to perform all charted interventions without increase in pain. Pt continues with difficulty with hip flexion. Will incorporate balance and neuromuscular control intervention NV.       Plan: Continue per plan of care.  Progress treatment as tolerated.       Precautions: Posterior hip precautions  Access Code: [email or web address]    POC expires Unit limit Auth Expiration date PT/OT/ST + Visit Limit?   23 3 23 BOMN                           Visit/Unit Tracking  AUTH Status:  Date        Not required Used 1 2 3 4 5 6 7 8        Remaining                      Date      Re-Eval   1st post-op          FOTO     NV NV  67/72     Manuals                                                                 Neuro Re-Ed             Glute set 5\"x20  review  Prone u/l 5\"x20 Prone u/l 5\"x20 Prone u/l 5x20      Quad set  5\"x20  review  Prone 5\"x20 Prone 5\"x20 Prone 5\"x20      BioDex LOS      L12/11 x3 X3  x3     BioDex weightbearing % squat      2x10 2x10  2x10     Side stepping        GTB 4x laps BUE  GTB 4 laps                               Ther Ex             Pt edu   SC  HEP, POC          PROM   NV SC         Upright bike  5' ROM  6' ROM 5' ROM 6' ROM 6' ROM  6' recumbent     Hip abduction Supine 3x10  review S/l x10 S/l 2x10 " "S/l 2x10 S/l 2x10  2# s/l 2x10     LAQ 3x10  review 2# 3\" 3x10 2# 3\" 3x10 4# 3\" 3x10 4# 3x10x3\" 4# 3\" 3x10     Heel slides 10\"x10  review 10\"x10 AROM  X10 AROM NT  Hip flexion AROM 5\"x10     SLR    2x5 Josette 2x10 AROM  X10 AROM p! 2x10  2x12     Hip extension     2x10 prone 2x10 prone 2x15 prone 2x10 2# prone     Hamstring curl     2x10 prone 4# 2x12 prone 2x15 4# prone 2x15 4# prone      LTR  10\"x10           clamshell  2x10           bridge  2x10           Fire hydrant  2x10           TG squat for ROM  5\" 2x10   Stand PB 2x10        Stool IR/ER  x30           HR/TR   review                       Ther Activity             STS   X5 BUE   3x5 20\" box, eccentric focus 3x5 20\" box, ecc focus 2x8 20\" ecc focus  NV     Leg press        NV  95# 2x10     TUG   RW x1           Gait Training             LRAD       3x laps around clinic                   Modalities                                                   "

## 2024-01-02 ENCOUNTER — OFFICE VISIT (OUTPATIENT)
Dept: PHYSICAL THERAPY | Facility: CLINIC | Age: 54
End: 2024-01-02
Payer: COMMERCIAL

## 2024-01-02 DIAGNOSIS — M25.552 LEFT HIP PAIN: Primary | ICD-10-CM

## 2024-01-02 PROCEDURE — 97110 THERAPEUTIC EXERCISES: CPT

## 2024-01-02 PROCEDURE — 97530 THERAPEUTIC ACTIVITIES: CPT

## 2024-01-02 NOTE — PROGRESS NOTES
"Daily Note     Today's date: 2024  Patient name: Jose David Smalls  : 1970  MRN: [id number]  Referring provider: Quinton Randall MD  Dx:   Encounter Diagnosis     ICD-10-CM    1. Left hip pain  M25.552           Start Time: 1145  Stop Time: 1232  Total time in clinic (min): 47 minutes    Subjective: Pt reports that he is a little sore and more stiff than he was last week, but he thinks he may have overdone it last week.       Objective: See treatment diary below      Assessment: PT made minimal progressions due to pt report of soreness. Pt had improved mobility post session. Hip flexor weakness remains. Will continue to progress functional strengthening NV.       Plan: Continue per plan of care.  Progress treatment as tolerated.       Precautions: Posterior hip precautions  Access Code: [email or web address]    POC expires Unit limit Auth Expiration date PT/OT/ST + Visit Limit?   23 3 23 BOMN                           Visit/Unit Tracking  AUTH Status:  Date 2      Not required Used 1 2 3 4 5 6 7 8 9       Remaining                      Date     Re-Eval   1st post-op          FOTO     NV NV  67/72     Manuals                                                                 Neuro Re-Ed             Glute set 5\"x20  review  Prone u/l 5\"x20 Prone u/l 5\"x20 Prone u/l 5x20      Quad set  5\"x20  review  Prone 5\"x20 Prone 5\"x20 Prone 5\"x20      BioDex LOS      L12/11 x3 X3  x3 L10 x4    BioDex weightbearing % squat      2x10 2x10  2x10     Side stepping        GTB 4x laps BUE  GTB 4 laps     Hip flexion 2 inch hold         20\"x4                 Ther Ex             Pt edu   SC  HEP, POC          PROM   NV SC         Upright bike  5' ROM  6' ROM 5' ROM 6' ROM 6' ROM  6' recumbent 6' ROM    Hip abduction Supine 3x10  review S/l x10 S/l 2x10 S/l 2x10 S/l 2x10  2# s/l 2x10 2# s/l 2x10    LAQ 3x10  review 2# 3\" 3x10 2# 3\" 3x10 4# " "3\" 3x10 4# 3x10x3\" 4# 3\" 3x10 4# 3\" 3x10     Heel slides 10\"x10  review 10\"x10 AROM  X10 AROM NT  Hip flexion AROM 5\"x10     SLR    2x5 Josette 2x10 AROM  X10 AROM p! 2x10  2x12 2# 2x8    Hip extension     2x10 prone 2x10 prone 2x15 prone 2x10 2# prone 2x10 2# prone    Hamstring curl     2x10 prone 4# 2x12 prone 2x15 4# prone 2x15 4# prone  2# 2x20 prone    LTR  10\"x10           clamshell  2x10           bridge  2x10           Fire hydrant  2x10           TG squat for ROM  5\" 2x10   Stand PB 2x10        Stool IR/ER  x30           HR/TR   review                       Ther Activity             STS   X5 BUE   3x5 20\" box, eccentric focus 3x5 20\" box, ecc focus 2x8 20\" ecc focus  NV 2x12 20\" ecc focus    Leg press        NV  95# 2x10 95# 2x12    TUG   RW x1           Gait Training             LRAD       3x laps around clinic                   Modalities                                                   "

## 2024-01-04 ENCOUNTER — OFFICE VISIT (OUTPATIENT)
Dept: PHYSICAL THERAPY | Facility: CLINIC | Age: 54
End: 2024-01-04
Payer: COMMERCIAL

## 2024-01-04 DIAGNOSIS — E29.1 HYPOGONADISM IN MALE: ICD-10-CM

## 2024-01-04 DIAGNOSIS — M25.552 LEFT HIP PAIN: Primary | ICD-10-CM

## 2024-01-04 PROCEDURE — 97112 NEUROMUSCULAR REEDUCATION: CPT

## 2024-01-04 PROCEDURE — 97110 THERAPEUTIC EXERCISES: CPT

## 2024-01-04 RX ORDER — SYRINGE, DISPOSABLE, 1 ML
SYRINGE, EMPTY DISPOSABLE MISCELLANEOUS WEEKLY
Qty: 25 EACH | Refills: 0 | OUTPATIENT
Start: 2024-01-04

## 2024-01-04 RX ORDER — TESTOSTERONE CYPIONATE 200 MG/ML
100 INJECTION, SOLUTION INTRAMUSCULAR WEEKLY
Qty: 10 ML | Refills: 0 | Status: SHIPPED | OUTPATIENT
Start: 2024-01-04

## 2024-01-04 RX ORDER — NEEDLES, SAFETY 18GX1 1/2"
NEEDLE, DISPOSABLE MISCELLANEOUS WEEKLY
Qty: 100 EACH | Refills: 0 | OUTPATIENT
Start: 2024-01-04

## 2024-01-04 NOTE — PROGRESS NOTES
"Daily Note     Today's date: 2024  Patient name: Jose David Smalls  : 1970  MRN: [id number]  Referring provider: Quinton Randall MD  Dx:   Encounter Diagnosis     ICD-10-CM    1. Left hip pain  M25.552           Start Time: 1143  Stop Time: 1226  Total time in clinic (min): 43 minutes    Subjective: Pt reports that he was able to tie his shoe the other day. He states that he still feels stiff.       Objective: See treatment diary below  Flexion AROM: 114*  Abduction AROM: 46*    Assessment: Pt is challenged with today's progression. Pt has difficulty with SLS with eyes closed. A/PROM is progressing well. Will continue to challenge functional strengthening in future visits.       Plan: Continue per plan of care.  Progress treatment as tolerated.       Precautions: Posterior hip precautions  Access Code: [email or web address]    POC expires Unit limit Auth Expiration date PT/OT/ST + Visit Limit?   23 3 23 BOMN                           Visit/Unit Tracking  AUTH Status:  Date      Not required Used 1 2 3 4 5 6 7 8 9 10      Remaining                      Date    Re-Eval   1st post-op          FOTO     NV NV  67/72     Manuals                                                                 Neuro Re-Ed             Glute set 5\"x20  review  Prone u/l 5\"x20 Prone u/l 5\"x20 Prone u/l 5x20      Quad set  5\"x20  review  Prone 5\"x20 Prone 5\"x20 Prone 5\"x20      BioDex LOS      L12/11 x3 X3  x3 L10 x4 L9 x4   BioDex weightbearing % squat      2x10 2x10  2x10     Side stepping        GTB 4x laps BUE  GTB 4 laps     Hip flexion 2 inch hold         20\"x4    SLS           30\"x2 foam   Rebounder           GMB foam x30                Ther Ex             Pt edu   SC  HEP, POC          PROM   NV SC      SC   Upright bike  5' ROM  6' ROM 5' ROM 6' ROM 6' ROM  6' recumbent 6' ROM 6' ROM L2   Hip abduction Supine 3x10  review " "S/l x10 S/l 2x10 S/l 2x10 S/l 2x10  2# s/l 2x10 2# s/l 2x10    LAQ 3x10  review 2# 3\" 3x10 2# 3\" 3x10 4# 3\" 3x10 4# 3x10x3\" 4# 3\" 3x10 4# 3\" 3x10     Heel slides 10\"x10  review 10\"x10 AROM  X10 AROM NT  Hip flexion AROM 5\"x10     SLR    2x5 Josette 2x10 AROM  X10 AROM p! 2x10  2x12 2# 2x8    Hip extension     2x10 prone 2x10 prone 2x15 prone 2x10 2# prone 2x10 2# prone    Hamstring curl     2x10 prone 4# 2x12 prone 2x15 4# prone 2x15 4# prone  2# 2x20 prone    LTR  10\"x10           clamshell  2x10           bridge  2x10           Fire hydrant  2x10           TG squat for ROM  5\" 2x10   Stand PB 2x10        Stool IR/ER  x30           HR/TR   review                       Ther Activity             STS   X5 BUE   3x5 20\" box, eccentric focus 3x5 20\" box, ecc focus 2x8 20\" ecc focus  NV 2x12 20\" ecc focus Gray mat staggered stance  2x8   Leg press        NV  95# 2x10 95# 2x12 # 2x10     Ecc SL 95# 2x10   Ecc heel tap          6\" fwd/lat 2x10 ea   TUG   RW x1           Gait Training             LRAD       3x laps around clinic                   Modalities                                                   "

## 2024-01-04 NOTE — TELEPHONE ENCOUNTER
"Reason for call:   [x] Refill   [] Prior Auth  [x] Other: Pt requesting 3 bottles of testosterone at a time bc once seal is broken the solution has to be thrown out. Please review qty    Office:   [] PCP/Provider -   [x] Specialty/Provider - Uro/  Marlene Perez    Medication: testosterone inject /syringe / needles    Dose/Frequency: 200 mg once weekly / 3mL / 25G x 1 1/2\"    Quantity: 90D w refill    Pharmacy: [facility] on file    Does the patient have enough for 3 days?   [x] Yes   [] No - Send as HP to POD    "

## 2024-01-09 ENCOUNTER — OFFICE VISIT (OUTPATIENT)
Dept: PHYSICAL THERAPY | Facility: CLINIC | Age: 54
End: 2024-01-09
Payer: COMMERCIAL

## 2024-01-09 DIAGNOSIS — M25.552 LEFT HIP PAIN: Primary | ICD-10-CM

## 2024-01-09 PROCEDURE — 97112 NEUROMUSCULAR REEDUCATION: CPT

## 2024-01-09 PROCEDURE — 97110 THERAPEUTIC EXERCISES: CPT

## 2024-01-09 PROCEDURE — 97530 THERAPEUTIC ACTIVITIES: CPT

## 2024-01-09 NOTE — PROGRESS NOTES
"Daily Note     Today's date: 2024  Patient name: Jose David Smalls  : 1970  MRN: [id number]  Referring provider: Quinton Randall MD  Dx:   Encounter Diagnosis     ICD-10-CM    1. Left hip pain  M25.552             Start Time: 1145  Stop Time: 1226  Total time in clinic (min): 41 minutes    Subjective: Pt reports that he started doing some leg work outs at the gym yesterday. He states that he is feeling good.       Objective: See treatment diary below    Assessment:  Pt has improved tolerance to resisted SLR today as well as improved form with STS. Pt continues to make positive gains. Will continue to challenge functional strengthening in future visits.       Plan: Continue per plan of care.  Progress treatment as tolerated.       Precautions: Posterior hip precautions  Access Code: [email or web address]    POC expires Unit limit Auth Expiration date PT/OT/ST + Visit Limit?   23 3 23 BOMN                           Visit/Unit Tracking  AUTH Status:  Date     Not required Used 1 2 3 4 5 6 7 8 9 10 11     Remaining                      Date    Re-Eval   1st post-op          FOTO     NV NV  67/72     Manuals                                                                 Neuro Re-Ed             Glute set   review  Prone u/l 5\"x20 Prone u/l 5\"x20 Prone u/l 5x20      Quad set    review  Prone 5\"x20 Prone 5\"x20 Prone 5\"x20      BioDex LOS      L12/11 x3 X3  x3 L10 x4 L9 x4   BioDex weightbearing % squat      2x10 2x10  2x10     Side stepping        GTB 4x laps BUE  GTB 4 laps     Hip flexion 2 inch hold 20\"x4        20\"x4    SLS           30\"x2 foam   Rebounder  GMB foam 3-way x20 ea         GMB foam x30                Ther Ex             Pt edu   SC  HEP, POC          PROM   NV SC      SC   Upright bike 6'  5' ROM  6' ROM 5' ROM 6' ROM 6' ROM  6' recumbent 6' ROM 6' ROM L2   Hip abduction   review S/l x10 S/l " "2x10 S/l 2x10 S/l 2x10  2# s/l 2x10 2# s/l 2x10    LAQ   review 2# 3\" 3x10 2# 3\" 3x10 4# 3\" 3x10 4# 3x10x3\" 4# 3\" 3x10 4# 3\" 3x10     Heel slides   review 10\"x10 AROM  X10 AROM NT  Hip flexion AROM 5\"x10     SLR 3# 2x8 +ER   2x5 Josette 2x10 AROM  X10 AROM p! 2x10  2x12 2# 2x8    Hip extension     2x10 prone 2x10 prone 2x15 prone 2x10 2# prone 2x10 2# prone    Hamstring curl     2x10 prone 4# 2x12 prone 2x15 4# prone 2x15 4# prone  2# 2x20 prone    TG SL SL L10 2x10            Piriformis stretch  30\"x3 sit modified            Quad stretch Prone c strap 30\"x3            Hamstring stretch Supine c strap 30\"x3            LTR  10\"x10           clamshell  2x10           bridge  2x10           Fire hydrant  2x10           TG squat for ROM  5\" 2x10   Stand PB 2x10        Stool IR/ER  x30           HR/TR   review                       Ther Activity             STS Gray mat staggered stance 2x12  X5 BUE   3x5 20\" box, eccentric focus 3x5 20\" box, ecc focus 2x8 20\" ecc focus  NV 2x12 20\" ecc focus Gray mat staggered stance  2x8   Leg press  # 2x10    Ecc # 2x10      NV  95# 2x10 95# 2x12 # 2x10     Ecc SL 95# 2x10   Ecc heel tap          6\" fwd/lat 2x10 ea   TUG   RW x1           Gait Training             LRAD       3x laps around clinic                   Modalities                                                   "

## 2024-01-11 ENCOUNTER — APPOINTMENT (OUTPATIENT)
Dept: PHYSICAL THERAPY | Facility: CLINIC | Age: 54
End: 2024-01-11
Payer: COMMERCIAL

## 2024-01-16 ENCOUNTER — OFFICE VISIT (OUTPATIENT)
Dept: PHYSICAL THERAPY | Facility: CLINIC | Age: 54
End: 2024-01-16
Payer: COMMERCIAL

## 2024-01-16 DIAGNOSIS — M25.552 LEFT HIP PAIN: Primary | ICD-10-CM

## 2024-01-16 PROCEDURE — 97110 THERAPEUTIC EXERCISES: CPT

## 2024-01-16 PROCEDURE — 97530 THERAPEUTIC ACTIVITIES: CPT

## 2024-01-16 NOTE — PROGRESS NOTES
"Daily Note     Today's date: 2024  Patient name: Jose David Smalls  : 1970  MRN: [id number]  Referring provider: Quinton Randall MD  Dx:   Encounter Diagnosis     ICD-10-CM    1. Left hip pain  M25.552                      Subjective: Pt reports he has been feeling very well the last 2 days.  Pt states that prior to this, he was experiencing pain b/l on ischial tuberosity.   He reports this improved after performing hamstring stretching at home.      Objective: See treatment diary below      Assessment: Patient progressing well with functional strength.  He is challenged with increased reliance on LLE to perform STS transfer.  Patient completes charted interventions without complications.  Pt would benefit from continued PT in order to improve LLE stability and strength for improved function during daily activities.      Plan: Continue per plan of care.      Precautions: Posterior hip precautions  Access Code: [email or web address]    POC expires Unit limit Auth Expiration date PT/OT/ST + Visit Limit?   23 3 23 BOMN                           Visit/Unit Tracking  AUTH Status:  Date    Not required Used 1 2 3 4 5 6 7 8 9 10 11 12    Remaining                      Date    Re-Eval   1st post-op          FOTO     NV NV  67/72     Manuals                                                                 Neuro Re-Ed             Glute set   review  Prone u/l 5\"x20 Prone u/l 5\"x20 Prone u/l 5x20      Quad set    review  Prone 5\"x20 Prone 5\"x20 Prone 5\"x20      BioDex LOS      L12/11 x3 X3  x3 L10 x4 L9 x4   BioDex weightbearing % squat      2x10 2x10  2x10     Side stepping        GTB 4x laps BUE  GTB 4 laps     Hip flexion 2 inch hold 20\"x4        20\"x4    SLS           30\"x2 foam   Rebounder  GMB foam 3-way x20 ea RMB foam 3 way x20        GMB foam x30                Ther Ex             Pt edu   SC  HEP, " "POC          PROM   NV SC      SC   Upright bike 6'  6' ROM  6' ROM 5' ROM 6' ROM 6' ROM  6' recumbent 6' ROM 6' ROM L2   Hip abduction   review S/l x10 S/l 2x10 S/l 2x10 S/l 2x10  2# s/l 2x10 2# s/l 2x10    LAQ   review 2# 3\" 3x10 2# 3\" 3x10 4# 3\" 3x10 4# 3x10x3\" 4# 3\" 3x10 4# 3\" 3x10     Heel slides   review 10\"x10 AROM  X10 AROM NT  Hip flexion AROM 5\"x10     SLR 3# 2x8 +ER 3# 3x8 + ER 2x8  2x5 Josette 2x10 AROM  X10 AROM p! 2x10  2x12 2# 2x8    Hip extension     2x10 prone 2x10 prone 2x15 prone 2x10 2# prone 2x10 2# prone    Hamstring curl     2x10 prone 4# 2x12 prone 2x15 4# prone 2x15 4# prone  2# 2x20 prone    TG SL SL L10 2x10 L10 2x10           Piriformis stretch  30\"x3 sit modified Supine- modified 1'           Quad stretch Prone c strap 30\"x3 Prone c strap 30\"x3           Hamstring stretch Supine c strap 30\"x3 Supine c strap 30\"x3           LTR             clamshell             bridge             Fire hydrant             TG squat for ROM     Stand PB 2x10        Stool IR/ER             HR/TR   review          X-walk  BTB 3 laps           Ther Activity             STS Gray mat staggered stance 2x12 Gray mat staggered 2x12 X5 BUE   3x5 20\" box, eccentric focus 3x5 20\" box, ecc focus 2x8 20\" ecc focus  NV 2x12 20\" ecc focus Gray mat staggered stance  2x8   Leg press  # 2x10    Ecc # 2x10 # 3x10    Ecc # 2x10     NV  95# 2x10 95# 2x12 # 2x10     Ecc SL 95# 2x10   Ecc heel tap          6\" fwd/lat 2x10 ea   TUG   RW x1           Gait Training             LRAD       3x laps around clinic                   Modalities                                                     "

## 2024-01-18 ENCOUNTER — APPOINTMENT (OUTPATIENT)
Dept: PHYSICAL THERAPY | Facility: CLINIC | Age: 54
End: 2024-01-18
Payer: COMMERCIAL

## 2024-01-19 DIAGNOSIS — E29.1 HYPOGONADISM IN MALE: Primary | ICD-10-CM

## 2024-01-23 ENCOUNTER — OFFICE VISIT (OUTPATIENT)
Dept: PHYSICAL THERAPY | Facility: CLINIC | Age: 54
End: 2024-01-23
Payer: COMMERCIAL

## 2024-01-23 DIAGNOSIS — M25.552 LEFT HIP PAIN: Primary | ICD-10-CM

## 2024-01-23 PROCEDURE — 97110 THERAPEUTIC EXERCISES: CPT

## 2024-01-23 PROCEDURE — 97530 THERAPEUTIC ACTIVITIES: CPT

## 2024-01-23 PROCEDURE — 97112 NEUROMUSCULAR REEDUCATION: CPT

## 2024-01-23 NOTE — PROGRESS NOTES
PT Discharge  Today's date: 2024  Patient name: Jose David Smalls  : 1970  MRN: [id number]  Referring provider: Quinton Randall MD  Dx:   Encounter Diagnosis     ICD-10-CM    1. Left hip pain  M25.552           Start Time: 1145  Stop Time: 1230  Total time in clinic (min): 45 minutes    Assessment  Assessment details: Pt is a 53 y.o. male presenting to PT services s/p L LEXY on 23. Pt has been participating in PT for 7 weeks and has made significant improvement in regards to LLE strength, L hip mobility, decreased pain, and improved functional ability. PT and pt have discussed and agreed that pt has met maximum benefit of skilled physical therapy and will continue to benefit from independent performance of HEP. Pt was informed that if he has any questions or concerns, he is welcome to contact facility at any time. Pt is discharged from skilled physical therapy at this time.   Impairments: abnormal gait, abnormal or restricted ROM, activity intolerance, impaired balance, impaired physical strength, pain with function and weight-bearing intolerance    Goals  STG (4 weeks):  1. Pt will improve L hip flexion strength to be at least 4+/5 without increase in pain GOAL MET   2. Pt will improve L hip abduction strength to be at least 4+/5 GOAL MET  3. Pt will improve L hip flexion AROM to be at least 90* GOAL MET    LTG (8 weeks):  1. Pt will be independent in HEP GOAL MET  2. Pt will ambulate without AD GOAL MET  3. Pt will improve LLE strength to be 5/5  GOAL MET  4. Pt will be able to negotiate stairs without limitation GOAL MET    Plan  Therapy options: independent HEP.  Planned therapy interventions: home exercise program  Treatment plan discussed with: patient and family      Subjective Evaluation    History of Present Illness  Mechanism of injury: Pt states that he is feeling about 90% better since beginning PT. He states that he does still have some limitations with putting his shoe on.  Patient  "Goals  Patient goals for therapy: decreased pain, improved balance, increased motion, return to sport/leisure activities and increased strength  Patient goal: \"to give me the best outcome when I'm done and I want to be 100%. I would like to get my socks back on without pain, and gain flexibility.\"  Pain  Current pain ratin  At best pain ratin  At worst pain ratin  Location: lateral L hip/incisional pain  Quality: dull ache  Alleviating factors: stretch.  Exacerbated by: random.  Progression: improved    Social Support  Steps to enter house: yes (3 steps, no hand rails)  Stairs in house: yes (13 steps, 1 hand rail; FFSU available without shower)   Lives in: multiple-level home  Lives with: spouse (2 daughter (19 and 17 years old, and 24 year old daughter), 4 cats, 2 dogs)    Employment status: working (finacial advisor)  Hand dominance: left    Treatments  Previous treatment: injection treatment and medication        Objective     Active Range of Motion   Left Hip   Flexion: 107 degrees   Abduction: 40 degrees     Right Hip   Flexion: 103 degrees   Abduction: 38 degrees     Strength/Myotome Testing     Left Hip   Planes of Motion   Flexion: 5  Extension: 5  Abduction: 5    Right Hip   Planes of Motion   Flexion: 5  Extension: 5  Abduction: 5    Left Knee   Flexion: 5  Extension: 5    Right Knee   Flexion: 5  Extension: 5  Neuro Exam:     Functional outcomes   5x sit to stand: 5.38 no UE (seconds)  TU.48 no AD (seconds)         Precautions: None  Access Code: [email or web address]    POC expires Unit limit Auth Expiration date PT/OT/ST + Visit Limit?   23 3 23 BOMN                           Visit/Unit Tracking  AUTH Status:  Date 1/23 12/1 12/11 12/14 12/19 12/21 12/26 12/28 1/2 1/4 1/9 1/16   Not required Used 13 2 3 4 5 6 7 8 9 10 11 12    Remaining                      Date    Re-Eval             FOTO     NV NV  67/72     Manuals                       " "                                          Neuro Re-Ed             Glute set     Prone u/l 5\"x20 Prone u/l 5\"x20 Prone u/l 5x20      Quad set      Prone 5\"x20 Prone 5\"x20 Prone 5\"x20      BioDex LOS   SL L11 x3   L12/11 x3 X3  x3 L10 x4 L9 x4   BioDex weightbearing % squat      2x10 2x10  2x10     Side stepping        GTB 4x laps BUE  GTB 4 laps     Hip flexion 2 inch hold 20\"x4        20\"x4    SLS           30\"x2 foam   Rebounder  GMB foam 3-way x20 ea RMB foam 3 way x20 RMB foam 3 way x20 ea       GMB foam x30                Ther Ex             Pt edu             PROM    SC      SC   Upright bike 6'  6' ROM 6' ROM 6' ROM 5' ROM 6' ROM 6' ROM  6' recumbent 6' ROM 6' ROM L2   Hip abduction    S/l x10 S/l 2x10 S/l 2x10 S/l 2x10  2# s/l 2x10 2# s/l 2x10    LAQ    2# 3\" 3x10 2# 3\" 3x10 4# 3\" 3x10 4# 3x10x3\" 4# 3\" 3x10 4# 3\" 3x10     Heel slides    10\"x10 AROM  X10 AROM NT  Hip flexion AROM 5\"x10     SLR 3# 2x8 +ER 3# 3x8 + ER 2x8  2x5 Josette 2x10 AROM  X10 AROM p! 2x10  2x12 2# 2x8    Hip extension     2x10 prone 2x10 prone 2x15 prone 2x10 2# prone 2x10 2# prone    Hamstring curl     2x10 prone 4# 2x12 prone 2x15 4# prone 2x15 4# prone  2# 2x20 prone    TG SL SL L10 2x10 L10 2x10 L10 2x12          Piriformis stretch  30\"x3 sit modified Supine- modified 1'           Quad stretch Prone c strap 30\"x3 Prone c strap 30\"x3           Hamstring stretch Supine c strap 30\"x3 Supine c strap 30\"x3           LTR             clamshell             bridge             Fire hydrant             TG squat for ROM     Stand PB 2x10        Stool IR/ER             HR/TR             X-walk  BTB 3 laps BTB 3 laps           Long sit hip flexion over cone    2x10           Hip flexion with KB   10# KB 2x10                                    Ther Activity             STS Gray mat staggered stance 2x12 Gray mat staggered 2x12 Gray mat stagger 2x15  3x5 20\" box, eccentric focus 3x5 20\" box, ecc focus 2x8 20\" ecc focus  NV 2x12 20\" ecc focus Gray mat " "staggered stance  2x8   Leg press  # 2x10    Ecc # 2x10 # 3x10    Ecc # 2x10 # 2x15    Ecc # 2x12    NV  95# 2x10 95# 2x12 # 2x10     Ecc SL 95# 2x10   Ecc heel tap   6\" fwd/lat 2x8       6\" fwd/lat 2x10 ea   TUG             Gait Training             LRAD       3x laps around clinic                   Modalities                                            "

## 2024-01-25 ENCOUNTER — TELEPHONE (OUTPATIENT)
Age: 54
End: 2024-01-25

## 2024-01-25 ENCOUNTER — APPOINTMENT (OUTPATIENT)
Dept: PHYSICAL THERAPY | Facility: CLINIC | Age: 54
End: 2024-01-25
Payer: COMMERCIAL

## 2024-01-25 DIAGNOSIS — E29.1 HYPOGONADISM IN MALE: ICD-10-CM

## 2024-01-25 RX ORDER — NEEDLES, SAFETY 18GX1 1/2"
NEEDLE, DISPOSABLE MISCELLANEOUS WEEKLY
Qty: 100 EACH | Refills: 0 | Status: SHIPPED | OUTPATIENT
Start: 2024-01-25

## 2024-01-25 RX ORDER — TESTOSTERONE CYPIONATE 200 MG/ML
100 INJECTION, SOLUTION INTRAMUSCULAR WEEKLY
Qty: 10 ML | Refills: 1 | Status: SHIPPED | OUTPATIENT
Start: 2024-01-25

## 2024-01-25 NOTE — TELEPHONE ENCOUNTER
Patient called the RX Refill Line. Message is being forwarded to the office.     Patient is requesting a call from Xenia directly. Would like to go over testosterone; making sure the dosage is correct and the size of the bottle is correct. States that he will get two injections out of the small bottle and will be good for thirty days when he opens it and then has to throw it out after that.    Please contact patient at [phone]

## 2024-01-25 NOTE — TELEPHONE ENCOUNTER
Sent over new prescription and needles to new pharmacy. All questions answered. Patient appreciative of call.

## 2024-02-13 DIAGNOSIS — E55.9 VITAMIN D DEFICIENCY: ICD-10-CM

## 2024-02-13 RX ORDER — ERGOCALCIFEROL 1.25 MG/1
CAPSULE ORAL
Qty: 12 CAPSULE | Refills: 0 | Status: SHIPPED | OUTPATIENT
Start: 2024-02-13

## 2024-02-21 PROBLEM — Z13.220 LIPID SCREENING: Status: RESOLVED | Noted: 2018-02-08 | Resolved: 2024-02-21

## 2024-02-21 PROBLEM — Z12.5 PROSTATE CANCER SCREENING: Status: RESOLVED | Noted: 2019-06-05 | Resolved: 2024-02-21

## 2024-02-21 PROBLEM — Z13.1 SCREENING FOR DIABETES MELLITUS: Status: RESOLVED | Noted: 2019-06-05 | Resolved: 2024-02-21

## 2024-02-21 PROBLEM — Z12.11 COLON CANCER SCREENING: Status: RESOLVED | Noted: 2019-06-05 | Resolved: 2024-02-21

## 2024-03-07 DIAGNOSIS — E29.1 HYPOGONADISM IN MALE: ICD-10-CM

## 2024-03-07 RX ORDER — TESTOSTERONE CYPIONATE 200 MG/ML
100 INJECTION, SOLUTION INTRAMUSCULAR WEEKLY
Qty: 10 ML | Refills: 0 | OUTPATIENT
Start: 2024-03-07

## 2024-03-07 RX ORDER — TESTOSTERONE CYPIONATE 200 MG/ML
100 INJECTION, SOLUTION INTRAMUSCULAR WEEKLY
Qty: 2 ML | Refills: 5 | Status: SHIPPED | OUTPATIENT
Start: 2024-03-07

## 2024-03-07 NOTE — TELEPHONE ENCOUNTER
Reason for call:   [x] Refill   [] Prior Auth  [x] Other: Patient would like to speak with the doctor regarding this script. He would like to know if he is able to get more than one refill at a time on his script. He stated he has issues getting this every month and will be on this medication the rest of his life so he would like the script to have refills. Please review and advise. Again patient would like a call from Xenia Perez to discuss this. Patient can be reached at .    Please also note in the patients chart under this medication it looks like [facility] last put this medication as a 90 day supply, This may need to be addressed with them in order for the patient to get his refill on time. Please contact pharmacy to get this corrected.    Office:   [] PCP/Provider -   [x] Specialty/Provider - URO    Medication:  testosterone cypionate (DEPO-TESTOSTERONE) 200 mg/mL SOLN    Dose/Frequency: Inject 0.5 mL (100 mg total) into a muscle once a week     Quantity: 10ml    Pharmacy: [id number] - [place] - [address]  [phone]    Does the patient have enough for 3 days?   [] Yes   [x] No - Send as HP to POD

## 2024-03-07 NOTE — TELEPHONE ENCOUNTER
Medication:  PDMP   Active agreement on file -       Other: Patient would like to speak with the doctor regarding this script. He would like to know if he is able to get more than one refill at a time on his script. He stated he has issues getting this every month and will be on this medication the rest of his life so he would like the script to have refills. Please review and advise. Again patient would like a call from Xenia Perez to discuss this. Patient can be reached at .    Please also note in the patients chart under this medication it looks like July last put this medication as a 90 day supply, This may need to be addressed with them in order for the patient to get his refill on time. Please contact pharmacy to get this corrected.

## 2024-04-05 ENCOUNTER — TELEPHONE (OUTPATIENT)
Age: 54
End: 2024-04-05

## 2024-04-05 DIAGNOSIS — E55.9 VITAMIN D DEFICIENCY: ICD-10-CM

## 2024-04-05 RX ORDER — ERGOCALCIFEROL 1.25 MG/1
50000 CAPSULE ORAL WEEKLY
Qty: 12 CAPSULE | Refills: 1 | Status: SHIPPED | OUTPATIENT
Start: 2024-04-05

## 2024-04-05 NOTE — TELEPHONE ENCOUNTER
Patient called the RX Refill Line. Message is being forwarded to the office.     Patient is requesting a refill on ergocalciferol (VITAMIN D2) 50,000 units. Please send to dex    Please contact patient at [phone]

## 2024-04-05 NOTE — TELEPHONE ENCOUNTER
Patient called the RX Refill Line. Message is being forwarded to the office.     Patient is requesting testosterone cypionate (DEPO-TESTOSTERONE) 200 mg/mL SOLN .  But he wants it changed to 1mL instead of 2mL bottles.  3 month supply. Sent to dex    Please contact patient at [phone] with any questions

## 2024-04-08 DIAGNOSIS — E29.1 HYPOGONADISM IN MALE: ICD-10-CM

## 2024-04-08 RX ORDER — TESTOSTERONE CYPIONATE 200 MG/ML
100 INJECTION, SOLUTION INTRAMUSCULAR WEEKLY
Qty: 1 ML | Refills: 5 | Status: SHIPPED | OUTPATIENT
Start: 2024-04-08

## 2024-04-24 DIAGNOSIS — E29.1 HYPOGONADISM IN MALE: ICD-10-CM

## 2024-04-24 RX ORDER — TESTOSTERONE CYPIONATE 200 MG/ML
100 INJECTION, SOLUTION INTRAMUSCULAR WEEKLY
Qty: 10 ML | Refills: 0 | Status: CANCELLED | OUTPATIENT
Start: 2024-04-24

## 2024-04-24 NOTE — TELEPHONE ENCOUNTER
Patient called in and he has been receiving 1mL vials however this only lasts him 2 weeks. A 10mL vial expires 28 days after puncturing so he has to waste medication and pt would like a script that will last him 90 days so he is not having to go to the pharmacy so often. Is there a way the script can be called in for 10mL with a note to the pharmacy saying to dispense 10 1mL vials that way he does not have to waste any medication with it expiring too soon or go back to the pharmacy so often? Patient would like a call back to discuss at [phone]     Reason for call:   [x] Refill   [] Prior Auth  [] Other:     Office:   [] PCP/Provider -   [x] Specialty/Provider - Urology    Medication:   testosterone cypionate (DEPO-TESTOSTERONE) 200 mg/mL SOLN - Inject 0.5 mL (100 mg total) into a muscle once a week     Pharmacy:   [id number] [place] - [address]     Does the patient have enough for 3 days?   [x] Yes   [] No - Send as HP to POD

## 2024-04-29 ENCOUNTER — TELEPHONE (OUTPATIENT)
Dept: GASTROENTEROLOGY | Facility: CLINIC | Age: 54
End: 2024-04-29

## 2024-04-29 NOTE — TELEPHONE ENCOUNTER
Called LMOM reminding pt to get his labs (CBC/Testosterone?PsaTotal, Diag) testing done prior to Urology appt with Marlene on 5/8/24.

## 2024-05-01 ENCOUNTER — APPOINTMENT (OUTPATIENT)
Dept: LAB | Facility: HOSPITAL | Age: 54
End: 2024-05-01
Payer: COMMERCIAL

## 2024-05-01 DIAGNOSIS — E29.1 HYPOGONADISM IN MALE: ICD-10-CM

## 2024-05-01 LAB
BASOPHILS # BLD AUTO: 0.04 THOUSANDS/ÂΜL (ref 0–0.1)
BASOPHILS NFR BLD AUTO: 1 % (ref 0–1)
EOSINOPHIL # BLD AUTO: 0.28 THOUSAND/ÂΜL (ref 0–0.61)
EOSINOPHIL NFR BLD AUTO: 5 % (ref 0–6)
ERYTHROCYTE [DISTWIDTH] IN BLOOD BY AUTOMATED COUNT: 12.5 % (ref 11.6–15.1)
HCT VFR BLD AUTO: 41.1 % (ref 36.5–49.3)
HGB BLD-MCNC: 14.3 G/DL (ref 12–17)
IMM GRANULOCYTES # BLD AUTO: 0.01 THOUSAND/UL (ref 0–0.2)
IMM GRANULOCYTES NFR BLD AUTO: 0 % (ref 0–2)
LYMPHOCYTES # BLD AUTO: 2.76 THOUSANDS/ÂΜL (ref 0.6–4.47)
LYMPHOCYTES NFR BLD AUTO: 50 % (ref 14–44)
MCH RBC QN AUTO: 32.9 PG (ref 26.8–34.3)
MCHC RBC AUTO-ENTMCNC: 34.8 G/DL (ref 31.4–37.4)
MCV RBC AUTO: 95 FL (ref 82–98)
MONOCYTES # BLD AUTO: 0.6 THOUSAND/ÂΜL (ref 0.17–1.22)
MONOCYTES NFR BLD AUTO: 11 % (ref 4–12)
NEUTROPHILS # BLD AUTO: 1.8 THOUSANDS/ÂΜL (ref 1.85–7.62)
NEUTS SEG NFR BLD AUTO: 33 % (ref 43–75)
NRBC BLD AUTO-RTO: 0 /100 WBCS
PLATELET # BLD AUTO: 214 THOUSANDS/UL (ref 149–390)
PMV BLD AUTO: 9.1 FL (ref 8.9–12.7)
PSA SERPL-MCNC: 1.35 NG/ML (ref 0–4)
RBC # BLD AUTO: 4.35 MILLION/UL (ref 3.88–5.62)
WBC # BLD AUTO: 5.49 THOUSAND/UL (ref 4.31–10.16)

## 2024-05-01 PROCEDURE — 84402 ASSAY OF FREE TESTOSTERONE: CPT

## 2024-05-01 PROCEDURE — 84403 ASSAY OF TOTAL TESTOSTERONE: CPT

## 2024-05-02 LAB
TESTOST FREE SERPL-MCNC: 11.7 PG/ML (ref 7.2–24)
TESTOST SERPL-MCNC: 592 NG/DL (ref 264–916)

## 2024-05-07 ENCOUNTER — TELEPHONE (OUTPATIENT)
Dept: UROLOGY | Facility: CLINIC | Age: 54
End: 2024-05-07

## 2024-05-07 DIAGNOSIS — E29.1 HYPOGONADISM IN MALE: ICD-10-CM

## 2024-05-07 RX ORDER — METHOCARBAMOL 500 MG/1
1 TABLET, FILM COATED ORAL 4 TIMES DAILY
COMMUNITY
Start: 2023-12-07

## 2024-05-07 RX ORDER — OXYCODONE HYDROCHLORIDE 5 MG/1
TABLET ORAL
COMMUNITY
Start: 2023-12-07

## 2024-05-07 RX ORDER — TESTOSTERONE CYPIONATE 200 MG/ML
100 INJECTION, SOLUTION INTRAMUSCULAR WEEKLY
Qty: 10 ML | Refills: 0 | Status: SHIPPED | OUTPATIENT
Start: 2024-05-07 | End: 2024-05-08 | Stop reason: SDUPTHER

## 2024-05-07 RX ORDER — ASPIRIN 81 MG/1
81 TABLET ORAL 2 TIMES DAILY
COMMUNITY
Start: 2023-12-08

## 2024-05-07 RX ORDER — TRAMADOL HYDROCHLORIDE 50 MG/1
TABLET ORAL
COMMUNITY
Start: 2023-12-07

## 2024-05-07 NOTE — TELEPHONE ENCOUNTER
LM on voicemail to call office regarding refill request.     If patient calls back, please relay message from Sonja COWAN:    Testosterone available in 10 ml vial. Rx sent to pharmacy for this with updated quantity

## 2024-05-07 NOTE — TELEPHONE ENCOUNTER
Patient of Xenia DENNIS, last seen 11/8/2023, next OV 5/8/2024    Patient calling with c/o that the way the testosterone medication is written he has to go to the pharmacy every 2 weeks to  a refill.    He gets 1 ml bottles which is two doses. He likes getting the 1 ml bottles but is requesting if the Rx can dispense multiple 1 ml bottles to avoid going to the pharmacy every other week?    Please advise.    Patient is due for a refill

## 2024-05-07 NOTE — TELEPHONE ENCOUNTER
Patient of Sonja Angel PA-C.  Patient having issues with testosterone dosing.  Transferred patient to office for further assistance.

## 2024-05-07 NOTE — PROGRESS NOTES
5/8/2024      Chief Complaint   Patient presents with    Follow-up         Assessment and Plan    54 y.o. male     1. Low testosterone   - Total testosterone (5/1/24) 592  - PSA (5/1/24) 1.35  - CBC within normal limits  - ADELAIDA in November 2023 benign  - FSH and LH abnormal, however, endocrinology state that this is due to testosterone supplementation   - Continue 0.5 mL injection (100 mg) weekly. I sent over 2 mL quantity which is 4 weeks of medication.   - Repeat testosterone and CBC in 6 months  - Call with any questions or concerns in the meantime  - All questions answered; patient understands and agrees with plan       History of Present Illness  Jose David Smalls is a 54 y.o. male patient with history of low testosterone here for follow up.     Doing well with 0.5 mL injection once weekly.  Denies side effects of medication.  Overall happy with symptoms at this time.  PSA within normal limits and ADELAIDA last visit benign.  CBC within normal limits.    Review of Systems   Constitutional:  Negative for activity change, appetite change, chills and fever.   HENT:  Negative for congestion and trouble swallowing.    Respiratory:  Negative for cough and shortness of breath.    Cardiovascular:  Negative for chest pain, palpitations and leg swelling.   Gastrointestinal:  Negative for abdominal pain, constipation, diarrhea, nausea and vomiting.   Genitourinary:  Negative for difficulty urinating, dysuria, flank pain, frequency, hematuria and urgency.   Musculoskeletal:  Negative for back pain and gait problem.   Skin:  Negative for wound.   Allergic/Immunologic: Negative for immunocompromised state.   Neurological:  Negative for dizziness and syncope.   Hematological:  Does not bruise/bleed easily.   Psychiatric/Behavioral:  Negative for confusion.    All other systems reviewed and are negative.      Vitals  Vitals:    05/08/24 0916   BP: 120/84   Pulse: 61   Resp: 16   Temp: 97.5 °F (36.4 °C)   SpO2: 99%   Weight: 91.6 kg  "(202 lb)   Height: 5' 9\" (1.753 m)       Physical Exam  Constitutional:       General: He is not in acute distress.     Appearance: Normal appearance. He is not ill-appearing, toxic-appearing or diaphoretic.   HENT:      Head: Normocephalic.      Nose: No congestion.   Eyes:      General: No scleral icterus.        Right eye: No discharge.         Left eye: No discharge.      Conjunctiva/sclera: Conjunctivae normal.      Pupils: Pupils are equal, round, and reactive to light.   Pulmonary:      Effort: Pulmonary effort is normal.   Musculoskeletal:      Cervical back: Normal range of motion.   Skin:     General: Skin is warm and dry.      Coloration: Skin is not jaundiced or pale.      Findings: No bruising, erythema, lesion or rash.   Neurological:      General: No focal deficit present.      Mental Status: He is alert and oriented to person, place, and time. Mental status is at baseline.      Gait: Gait normal.   Psychiatric:         Mood and Affect: Mood normal.         Behavior: Behavior normal.         Thought Content: Thought content normal.         Judgment: Judgment normal.           Past History  Past Medical History:   Diagnosis Date    Hypogonadism male      Social History     Socioeconomic History    Marital status: /Civil Union     Spouse name: None    Number of children: None    Years of education: None    Highest education level: None   Occupational History    None   Tobacco Use    Smoking status: Some Days     Types: Cigars     Passive exposure: Past    Smokeless tobacco: Never    Tobacco comments:     Occasionally a cigar smoker   Vaping Use    Vaping status: Never Used   Substance and Sexual Activity    Alcohol use: Yes     Alcohol/week: 3.0 standard drinks of alcohol     Types: 1 Glasses of wine, 1 Cans of beer, 1 Shots of liquor per week     Comment: Maybe a drink every other week socially    Drug use: Never    Sexual activity: Yes     Partners: Female     Birth control/protection: None "   Other Topics Concern    None   Social History Narrative    None     Social Determinants of Health     Financial Resource Strain: Low Risk  (12/8/2023)    Received from [facility]    Overall Financial Resource Strain (CARDIA)     Difficulty of Paying Living Expenses: Not hard at all   Food Insecurity: No Food Insecurity (12/8/2023)    Received from [facility]    Hunger Vital Sign     Worried About Running Out of Food in the Last Year: Never true     Ran Out of Food in the Last Year: Never true   Transportation Needs: No Transportation Needs (12/8/2023)    Received from [facility]    PRAPARE - Transportation     Lack of Transportation (Medical): No     Lack of Transportation (Non-Medical): No   Physical Activity: Not on file   Stress: Not on file   Social Connections: Not on file   Intimate Partner Violence: Not At Risk (12/8/2023)    Received from [facility]    Humiliation, Afraid, Rape, and Kick questionnaire     Fear of Current or Ex-Partner: No     Emotionally Abused: No     Physically Abused: No     Sexually Abused: No   Housing Stability: Low Risk  (12/8/2023)    Received from [facility]    Housing Stability Vital Sign     Unable to Pay for Housing in the Last Year: No     Number of Places Lived in the Last Year: 1     Unstable Housing in the Last Year: No     Social History     Tobacco Use   Smoking Status Some Days    Types: Cigars    Passive exposure: Past   Smokeless Tobacco Never   Tobacco Comments    Occasionally a cigar smoker     Family History   Problem Relation Age of Onset    Breast cancer Mother     Cancer Mother     Atrial fibrillation Father     Stomach cancer Paternal Uncle        The following portions of the patient's history were reviewed and updated as appropriate: allergies, current medications, past medical history, past social history, past surgical history and problem list.    Results  No results  found for this or any previous visit (from the past 1 hour(s)).]  Lab Results   Component Value Date    PSA 1.35 05/01/2024    PSA 1.2 05/03/2023    PSA 1.0 11/02/2022    PSA 1.3 06/06/2022     Lab Results   Component Value Date    CALCIUM 8.7 12/09/2023    K 3.9 12/09/2023    CO2 25 12/09/2023     12/09/2023    BUN 21 12/09/2023    CREATININE 0.99 12/09/2023     Lab Results   Component Value Date    WBC 5.49 05/01/2024    HGB 14.3 05/01/2024    HCT 41.1 05/01/2024    MCV 95 05/01/2024     05/01/2024       Xenia Perez PA-C

## 2024-05-07 NOTE — TELEPHONE ENCOUNTER
Patient contacted office back. Message relayed that updated prescription has been sent to the pharmacy. Patient states that he is unable to use the 10 mL vial. Patient disconnected phone call.

## 2024-05-08 ENCOUNTER — OFFICE VISIT (OUTPATIENT)
Dept: UROLOGY | Facility: CLINIC | Age: 54
End: 2024-05-08
Payer: COMMERCIAL

## 2024-05-08 VITALS
OXYGEN SATURATION: 99 % | SYSTOLIC BLOOD PRESSURE: 120 MMHG | TEMPERATURE: 97.5 F | DIASTOLIC BLOOD PRESSURE: 84 MMHG | BODY MASS INDEX: 29.92 KG/M2 | HEART RATE: 61 BPM | HEIGHT: 69 IN | WEIGHT: 202 LBS | RESPIRATION RATE: 16 BRPM

## 2024-05-08 DIAGNOSIS — E29.1 HYPOGONADISM IN MALE: Primary | ICD-10-CM

## 2024-05-08 PROCEDURE — 99213 OFFICE O/P EST LOW 20 MIN: CPT | Performed by: PHYSICIAN ASSISTANT

## 2024-05-08 RX ORDER — TESTOSTERONE CYPIONATE 200 MG/ML
100 INJECTION, SOLUTION INTRAMUSCULAR WEEKLY
Qty: 2 ML | Refills: 5 | Status: SHIPPED | OUTPATIENT
Start: 2024-05-08

## 2024-05-08 RX ORDER — CEPHALEXIN 500 MG/1
CAPSULE ORAL
COMMUNITY

## 2024-06-13 DIAGNOSIS — E29.1 HYPOGONADISM IN MALE: ICD-10-CM

## 2024-06-13 RX ORDER — TESTOSTERONE CYPIONATE 200 MG/ML
150 INJECTION, SOLUTION INTRAMUSCULAR WEEKLY
Qty: 4 ML | Refills: 5 | Status: SHIPPED | OUTPATIENT
Start: 2024-06-13

## 2024-06-24 ENCOUNTER — TELEPHONE (OUTPATIENT)
Age: 54
End: 2024-06-24

## 2024-07-30 DIAGNOSIS — E29.1 HYPOGONADISM IN MALE: ICD-10-CM

## 2024-07-30 NOTE — TELEPHONE ENCOUNTER
Reason for call:   [x] Refill   [] Prior Auth  [x] Other: Patient has scheduled appointment Thursday, 8/1/2024 for his injection. Please review.    Office:   [] PCP/Provider -   [x] Specialty/Provider - Urology - Xenia Perez PA-C     Medication: testosterone cypionate (DEPO-TESTOSTERONE) 200 mg/mL SOLN     Dose/Frequency: Inject 0.75 mL (150 mg total) into a muscle once a week     Quantity: 4 mL     Pharm[id number] [place] [facility] [phone]     Does the patient have enough for 3 days?   [] Yes   [x] No - Send as HP to POD

## 2024-08-01 DIAGNOSIS — E29.1 HYPOGONADISM IN MALE: ICD-10-CM

## 2024-08-01 RX ORDER — NEEDLES, SAFETY 18GX1 1/2"
NEEDLE, DISPOSABLE MISCELLANEOUS WEEKLY
Qty: 100 EACH | Refills: 0 | Status: SHIPPED | OUTPATIENT
Start: 2024-08-01

## 2024-08-01 NOTE — TELEPHONE ENCOUNTER
Reason for call:   [x] Refill   [] Prior Auth  [] Other:     Office:   [] PCP/Provider -   [x] Specialty/Provider -     Medication:         Does the patient have enough for 3 days?   [x] Yes   [] No - Send as HP to POD

## 2024-08-02 DIAGNOSIS — E55.9 VITAMIN D DEFICIENCY: ICD-10-CM

## 2024-08-02 RX ORDER — TESTOSTERONE CYPIONATE 200 MG/ML
150 INJECTION, SOLUTION INTRAMUSCULAR WEEKLY
Qty: 4 ML | Refills: 0 | Status: SHIPPED | OUTPATIENT
Start: 2024-08-02

## 2024-08-02 NOTE — TELEPHONE ENCOUNTER
Reason for call:   [x] Refill   [] Prior Auth  [] Other:     Office:   [x] PCP/Provider - Raphael  [] Specialty/Provider -     Medication: Vit D 50,000    Dose/Frequency: 1 cap once a week     Quantity: 12    [id number] [place] [facility] [place] - [address] [facility] [phone]     Does the patient have enough for 3 days?   [] Yes   [x] No - Send as HP to POD

## 2024-08-05 RX ORDER — ERGOCALCIFEROL 1.25 MG/1
50000 CAPSULE ORAL WEEKLY
Qty: 12 CAPSULE | Refills: 0 | Status: SHIPPED | OUTPATIENT
Start: 2024-08-05

## 2024-08-05 RX ORDER — NEEDLES, SAFETY 18GX1 1/2"
NEEDLE, DISPOSABLE MISCELLANEOUS WEEKLY
Qty: 100 EACH | Refills: 0 | Status: SHIPPED | OUTPATIENT
Start: 2024-08-05

## 2024-08-05 NOTE — TELEPHONE ENCOUNTER
Patient called and stated the prescriptions were sent to incorrect pharmacy, they are to go to [facility].  Asked for them to please be resent as soon as possible.

## 2024-09-17 ENCOUNTER — OFFICE VISIT (OUTPATIENT)
Dept: FAMILY MEDICINE CLINIC | Facility: CLINIC | Age: 54
End: 2024-09-17
Payer: COMMERCIAL

## 2024-09-17 VITALS
TEMPERATURE: 97.5 F | WEIGHT: 207 LBS | DIASTOLIC BLOOD PRESSURE: 76 MMHG | HEIGHT: 69 IN | SYSTOLIC BLOOD PRESSURE: 124 MMHG | HEART RATE: 69 BPM | OXYGEN SATURATION: 97 % | BODY MASS INDEX: 30.66 KG/M2

## 2024-09-17 DIAGNOSIS — E29.1 HYPOGONADISM IN MALE: ICD-10-CM

## 2024-09-17 DIAGNOSIS — E55.9 VITAMIN D DEFICIENCY: ICD-10-CM

## 2024-09-17 DIAGNOSIS — H93.13 TINNITUS OF BOTH EARS: ICD-10-CM

## 2024-09-17 DIAGNOSIS — Z00.00 ANNUAL PHYSICAL EXAM: Primary | ICD-10-CM

## 2024-09-17 DIAGNOSIS — Z13.220 SCREENING FOR LIPID DISORDERS: ICD-10-CM

## 2024-09-17 PROCEDURE — 99213 OFFICE O/P EST LOW 20 MIN: CPT

## 2024-09-17 PROCEDURE — 99396 PREV VISIT EST AGE 40-64: CPT

## 2024-09-17 NOTE — PATIENT INSTRUCTIONS
"Patient Education     Routine physical for adults   The Basics   Written by the doctors and editors at [facility]   What is a physical? -- A physical is a routine visit, or \"check-up,\" with your doctor. You might also hear it called a \"wellness visit\" or \"preventive visit.\"  During each visit, the doctor will:   Ask about your physical and mental health   Ask about your habits, behaviors, and lifestyle   Do an exam   Give you vaccines if needed   Talk to you about any medicines you take   Give advice about your health   Answer your questions  Getting regular check-ups is an important part of taking care of your health. It can help your doctor find and treat any problems you have. But it's also important for preventing health problems.  A routine physical is different from a \"sick visit.\" A sick visit is when you see a doctor because of a health concern or problem. Since physicals are scheduled ahead of time, you can think about what you want to ask the doctor.  How often should I get a physical? -- It depends on your age and health. In general, for people age 21 years and older:   If you are younger than 50 years, you might be able to get a physical every 3 years.   If you are 50 years or older, your doctor might recommend a physical every year.  If you have an ongoing health condition, like diabetes or high blood pressure, your doctor will probably want to see you more often.  What happens during a physical? -- In general, each visit will include:   Physical exam - The doctor or nurse will check your height, weight, heart rate, and blood pressure. They will also look at your eyes and ears. They will ask about how you are feeling and whether you have any symptoms that bother you.   Medicines - It's a good idea to bring a list of all the medicines you take to each doctor visit. Your doctor will talk to you about your medicines and answer any questions. Tell them if you are having any side effects that bother you. You " "should also tell them if you are having trouble paying for any of your medicines.   Habits and behaviors - This includes:   Your diet   Your exercise habits   Whether you smoke, drink alcohol, or use drugs   Whether you are sexually active   Whether you feel safe at home  Your doctor will talk to you about things you can do to improve your health and lower your risk of health problems. They will also offer help and support. For example, if you want to quit smoking, they can give you advice and might prescribe medicines. If you want to improve your diet or get more physical activity, they can help you with this, too.   Lab tests, if needed - The tests you get will depend on your age and situation. For example, your doctor might want to check your:   Cholesterol   Blood sugar   Iron level   Vaccines - The recommended vaccines will depend on your age, health, and what vaccines you already had. Vaccines are very important because they can prevent certain serious or deadly infections.   Discussion of screening - \"Screening\" means checking for diseases or other health problems before they cause symptoms. Your doctor can recommend screening based on your age, risk, and preferences. This might include tests to check for:   Cancer, such as breast, prostate, cervical, ovarian, colorectal, prostate, lung, or skin cancer   Sexually transmitted infections, such as chlamydia and gonorrhea   Mental health conditions like depression and anxiety  Your doctor will talk to you about the different types of screening tests. They can help you decide which screenings to have. They can also explain what the results might mean.   Answering questions - The physical is a good time to ask the doctor or nurse questions about your health. If needed, they can refer you to other doctors or specialists, too.  Adults older than 65 years often need other care, too. As you get older, your doctor will talk to you about:   How to prevent falling at " home   Hearing or vision tests   Memory testing   How to take your medicines safely   Making sure that you have the help and support you need at home  All topics are updated as new evidence becomes available and our peer review process is complete.  This topic retrieved from [employer] on: May 02, 2024.  Topic 660854 Version 1.0  Release: 32.4.3 - C32.122  © 2024 UpToDate, Inc. and/or its affiliates. All rights reserved.  Consumer Information Use and Disclaimer   Disclaimer: This generalized information is a limited summary of diagnosis, treatment, and/or medication information. It is not meant to be comprehensive and should be used as a tool to help the user understand and/or assess potential diagnostic and treatment options. It does NOT include all information about conditions, treatments, medications, side effects, or risks that may apply to a specific patient. It is not intended to be medical advice or a substitute for the medical advice, diagnosis, or treatment of a health care provider based on the health care provider's examination and assessment of a patient's specific and unique circumstances. Patients must speak with a health care provider for complete information about their health, medical questions, and treatment options, including any risks or benefits regarding use of medications. This information does not endorse any treatments or medications as safe, effective, or approved for treating a specific patient. UpToDate, Inc. and its affiliates disclaim any warranty or liability relating to this information or the use thereof.The use of this information is governed by the Terms of Use, available at https://www.woltersStyleUpuwer.com/en/know/clinical-effectiveness-terms. 2024© UpToDate, Inc. and its affiliates and/or licensors. All rights reserved.  Copyright   © 2024 UpToDate, Inc. and/or its affiliates. All rights reserved.

## 2024-09-17 NOTE — TELEPHONE ENCOUNTER
Reason for call:   [x] Refill   [] Prior Auth  [] Other:     Office:   [] PCP/Provider -   [x] Specialty/Provider - uro    Medication: testosterone cypionate (DEPO-TESTOSTERONE) 200 mg/mL SOLN     Dose/Frequency: 150 mg, Intramuscular, Weekly     Quantity: 4 ml    Pharmacy:  [id number] [place] [facility] - [address]     Does the patient have enough for 3 days?   [] Yes   [x] No - Send as HP to POD

## 2024-09-17 NOTE — ASSESSMENT & PLAN NOTE
Reviewed last lab 2022, 23 ng/mL low  Will recheck  Continue Vitamin D supplements  Orders:    Vitamin D 25 hydroxy; Future

## 2024-09-17 NOTE — PROGRESS NOTES
Adult Annual Physical  Name: Jose David Smalls      : 1970      MRN: [id number]  Encounter Provider: Chris Bourgeois PA-C  Encounter Date: 2024   Encounter department: [facility]    Assessment & Plan  Annual physical exam         Tinnitus of both ears  Pt states that he has had ringing in his ears for many yrs and decreased hearing, new complaint  Hx of  service  No cerumen impaction or erythema noted on exam  Would like evaluation, audiology referral placed  Orders:    Ambulatory Referral to Audiology; Future    Vitamin D deficiency  Reviewed last lab , 23 ng/mL low  Will recheck  Continue Vitamin D supplements  Orders:    Vitamin D 25 hydroxy; Future    Screening for lipid disorders    Orders:    Lipid panel; Future    Immunizations and preventive care screenings were discussed with patient today. Appropriate education was printed on patient's after visit summary.      Counseling:  Alcohol/drug use: discussed moderation in alcohol intake, the recommendations for healthy alcohol use, and avoidance of illicit drug use.  Dental Health: discussed importance of regular tooth brushing, flossing, and dental visits.  Injury prevention: discussed safety/seat belts, safety helmets, smoke detectors, carbon dioxide detectors, and smoking near bedding or upholstery.  Sexual health: discussed sexually transmitted diseases, partner selection, use of condoms, avoidance of unintended pregnancy, and contraceptive alternatives.  Exercise: the importance of regular exercise/physical activity was discussed. Recommend exercise 3-5 times per week for at least 30 minutes.       Depression Screening and Follow-up Plan: Patient was screened for depression during today's encounter. They screened negative with a PHQ-2 score of 0.    Tobacco Cessation Counseling: Tobacco cessation counseling was provided. The patient is sincerely urged to quit consumption of tobacco. He is not ready to quit  "tobacco.       History of Present Illness     Adult Annual Physical:  Patient presents for annual physical. Jose David Smalls is a 54 y.o. male with significant past medical history of vitamin D deficiency presenting for annual physical. He complains of \"ringing\" in his ears bilaterally for \"many years\" unsure of exact onset but also states he feels he has some degree of hearing loss as well. Hx is significant for prior service in the . Audiology referral placed. He continues to be on TRT, seeing urology for scheduled 6 month f/u in November. He is otherwise well.         Follow-up 1 year for annual, sooner PRN   .     Diet and Physical Activity:  - Diet/Nutrition: consuming 3-5 servings of fruits/vegetables daily, adequate whole grain intake and adequate fiber intake.  - Exercise: vigorous cardiovascular exercise and 3-4 times a week on average.    Depression Screening:  - PHQ-2 Score: 0    General Health:  - Sleep: sleeps well.  - Hearing: normal hearing right ear and normal hearing left ear.  - Vision: no vision problems.  - Dental: no dental visits for > 1 year.    Review of Systems   Constitutional:  Negative for chills and fever.   HENT:  Positive for hearing loss and tinnitus. Negative for ear pain and sore throat.    Eyes:  Negative for pain and visual disturbance.   Respiratory:  Negative for cough and shortness of breath.    Cardiovascular:  Negative for chest pain and palpitations.   Gastrointestinal:  Negative for abdominal pain and vomiting.   Genitourinary:  Negative for dysuria and hematuria.   Musculoskeletal:  Negative for arthralgias and back pain.   Skin:  Negative for color change and rash.   Neurological:  Negative for seizures and syncope.   All other systems reviewed and are negative.    Pertinent Medical History     Medical History Reviewed by provider this encounter:       Past Medical History   Past Medical History:   Diagnosis Date    Hypogonadism male      Past Surgical History: " "  Procedure Laterality Date    TONSILLECTOMY       Family History   Problem Relation Age of Onset    Breast cancer Mother     Cancer Mother     Atrial fibrillation Father     Stomach cancer Paternal Uncle      Current Outpatient Medications on File Prior to Visit   Medication Sig Dispense Refill    aspirin (ECOTRIN LOW STRENGTH) 81 mg EC tablet Take 81 mg by mouth 2 (two) times a day      BD ECLIPSE 25G X 1-1/2\" MISC Inject into a muscle once a week 100 each 0    celecoxib (CeleBREX) 200 mg capsule Daily      cephalexin (KEFLEX) 500 mg capsule Take 4 capsule(s) by mouth one hour prior to dental appointment.      ergocalciferol (VITAMIN D2) 50,000 units Take 1 capsule (50,000 Units total) by mouth once a week 12 capsule 0    methocarbamol (ROBAXIN) 500 mg tablet Take 1 tablet by mouth 4 (four) times a day      Mupirocin 2 % KIT APPLY A SMALL AMOUNT TO BOTH NOSTRILS TWICE A DAY FOR 5 DAYS PRIOR TO SURGERY      oxyCODONE (ROXICODONE) 5 immediate release tablet TAKE 1 TABLET EVERY 6 HOURS AS NEEDED FOR MODERATE TO SEVERE PAIN      sildenafil (VIAGRA) 25 MG tablet Take 1 tablet (25 mg total) by mouth as needed for erectile dysfunction for up to 30 doses . May take up to 4 tablets by mouth prior to intercourse if needed. 15 tablet 4    Syringe, Disposable, (BD Plastipak Syringe) 3 ML MISC Inject into a muscle once a week 25 each 0    SYRINGE-NEEDLE, DISP, 3 ML 20G X 1-1/2\" 3 ML MISC Use every 7 days 30 each 0    testosterone cypionate (DEPO-TESTOSTERONE) 200 mg/mL SOLN Inject 0.75 mL (150 mg total) into a muscle once a week 4 mL 0    traMADol (ULTRAM) 50 mg tablet TAKE 1 TABLET EVERY 6 HOURS AS NEEDED FOR MILD TO MODERATE PAIN       No current facility-administered medications on file prior to visit.     Allergies   Allergen Reactions    Ampicillin      nausea      Current Outpatient Medications on File Prior to Visit   Medication Sig Dispense Refill    aspirin (ECOTRIN LOW STRENGTH) 81 mg EC tablet Take 81 mg by mouth 2 " "(two) times a day      BD ECLIPSE 25G X 1-1/2\" MISC Inject into a muscle once a week 100 each 0    celecoxib (CeleBREX) 200 mg capsule Daily      cephalexin (KEFLEX) 500 mg capsule Take 4 capsule(s) by mouth one hour prior to dental appointment.      ergocalciferol (VITAMIN D2) 50,000 units Take 1 capsule (50,000 Units total) by mouth once a week 12 capsule 0    methocarbamol (ROBAXIN) 500 mg tablet Take 1 tablet by mouth 4 (four) times a day      Mupirocin 2 % KIT APPLY A SMALL AMOUNT TO BOTH NOSTRILS TWICE A DAY FOR 5 DAYS PRIOR TO SURGERY      oxyCODONE (ROXICODONE) 5 immediate release tablet TAKE 1 TABLET EVERY 6 HOURS AS NEEDED FOR MODERATE TO SEVERE PAIN      sildenafil (VIAGRA) 25 MG tablet Take 1 tablet (25 mg total) by mouth as needed for erectile dysfunction for up to 30 doses . May take up to 4 tablets by mouth prior to intercourse if needed. 15 tablet 4    Syringe, Disposable, (BD Plastipak Syringe) 3 ML MISC Inject into a muscle once a week 25 each 0    SYRINGE-NEEDLE, DISP, 3 ML 20G X 1-1/2\" 3 ML MISC Use every 7 days 30 each 0    testosterone cypionate (DEPO-TESTOSTERONE) 200 mg/mL SOLN Inject 0.75 mL (150 mg total) into a muscle once a week 4 mL 0    traMADol (ULTRAM) 50 mg tablet TAKE 1 TABLET EVERY 6 HOURS AS NEEDED FOR MILD TO MODERATE PAIN       No current facility-administered medications on file prior to visit.      Social History     Tobacco Use    Smoking status: Some Days     Types: Cigars     Passive exposure: Past    Smokeless tobacco: Never    Tobacco comments:     Occasionally a cigar smoker   Vaping Use    Vaping status: Never Used   Substance and Sexual Activity    Alcohol use: Yes     Alcohol/week: 3.0 standard drinks of alcohol     Types: 1 Glasses of wine, 1 Cans of beer, 1 Shots of liquor per week     Comment: Maybe a drink every other week socially    Drug use: Never    Sexual activity: Yes     Partners: Female     Birth control/protection: None       Objective     There were no " vitals taken for this visit.    Physical Exam  Vitals and nursing note reviewed.   Constitutional:       General: He is not in acute distress.     Appearance: He is well-developed.   HENT:      Head: Normocephalic and atraumatic.   Eyes:      Conjunctiva/sclera: Conjunctivae normal.   Cardiovascular:      Rate and Rhythm: Normal rate and regular rhythm.      Heart sounds: Murmur (known) heard.   Pulmonary:      Effort: Pulmonary effort is normal. No respiratory distress.      Breath sounds: Normal breath sounds.   Abdominal:      Palpations: Abdomen is soft.      Tenderness: There is no abdominal tenderness.   Musculoskeletal:         General: No swelling.      Cervical back: Neck supple.   Skin:     General: Skin is warm and dry.      Capillary Refill: Capillary refill takes less than 2 seconds.   Neurological:      Mental Status: He is alert.   Psychiatric:         Mood and Affect: Mood normal.       Administrative Statements   I have spent a total time of 30 minutes in caring for this patient on the day of the visit/encounter including Diagnostic results, Prognosis, Instructions for management, Counseling / Coordination of care, Documenting in the medical record, Obtaining or reviewing history  , and Communicating with other healthcare professionals . Topics discussed with the patient / family include medication review, medication adjustment, supportive listening, and anticipatory guidance.

## 2024-09-17 NOTE — TELEPHONE ENCOUNTER
Medication:  PDMP  07/22/2024 06/13/2024 Testosterone Cypionate (Oil) 4.0 28 200 MG/1 ML [facility] Private Pay 1 / 5 [place]   1 [id number] 06/17/2024 06/13/2024 Testosterone Cypionate (Oil) 4.0 28 200 MG/1 ML [facility] Commercial Insurance 0 / 5 [place]   2 [id number] 06/11/2024 05/08/2024 Testosterone Cypionate (Oil) 2.0 28 200 MG/1 ML [facility]  Active agreement on file -

## 2024-09-20 RX ORDER — TESTOSTERONE CYPIONATE 200 MG/ML
150 INJECTION, SOLUTION INTRAMUSCULAR WEEKLY
Qty: 4 ML | Refills: 0 | Status: SHIPPED | OUTPATIENT
Start: 2024-09-20

## 2024-10-21 DIAGNOSIS — E29.1 HYPOGONADISM IN MALE: ICD-10-CM

## 2024-10-21 RX ORDER — TESTOSTERONE CYPIONATE 200 MG/ML
150 INJECTION, SOLUTION INTRAMUSCULAR WEEKLY
Qty: 4 ML | Refills: 0 | Status: SHIPPED | OUTPATIENT
Start: 2024-10-21

## 2024-10-21 NOTE — TELEPHONE ENCOUNTER
Patient needs by Thursday that is when dose is due    Reason for call:   [x] Refill   [] Prior Auth  [] Other:     Office:   [] PCP/Provider -   [x] Specialty/Provider - Xenia Grimes    Medication: Testosterone Cypionate    Dose/Frequency: 200mg/ml 0.75 ml Weekly     Quantity: 4 ml     Pharmacy: [facility] [place]     Does the patient have enough for 3 days?   [] Yes   [x] No - Send as HP to POD  has half dose for Thursday

## 2024-11-01 ENCOUNTER — TELEPHONE (OUTPATIENT)
Dept: UROLOGY | Facility: CLINIC | Age: 54
End: 2024-11-01

## 2024-11-01 NOTE — TELEPHONE ENCOUNTER
Spoke with pt providing a friendly reminder of their upcoming appt is to go over their lab work. Informed pt they can get their lab work done at any  lab. Informed pt the testosterone test is a fasting test and has to be collected between the time of 7 - 9 am. Pt verbalized understanding. No further assistance

## 2024-11-05 ENCOUNTER — APPOINTMENT (OUTPATIENT)
Dept: LAB | Facility: HOSPITAL | Age: 54
End: 2024-11-05
Payer: COMMERCIAL

## 2024-11-05 DIAGNOSIS — Z13.220 SCREENING FOR LIPID DISORDERS: ICD-10-CM

## 2024-11-05 DIAGNOSIS — E29.1 HYPOGONADISM IN MALE: ICD-10-CM

## 2024-11-05 DIAGNOSIS — E55.9 VITAMIN D DEFICIENCY: ICD-10-CM

## 2024-11-05 LAB
25(OH)D3 SERPL-MCNC: 32.3 NG/ML (ref 30–100)
CHOLEST SERPL-MCNC: 143 MG/DL
ERYTHROCYTE [DISTWIDTH] IN BLOOD BY AUTOMATED COUNT: 12.6 % (ref 11.6–15.1)
HCT VFR BLD AUTO: 43 % (ref 36.5–49.3)
HDLC SERPL-MCNC: 52 MG/DL
HGB BLD-MCNC: 14.9 G/DL (ref 12–17)
LDLC SERPL CALC-MCNC: 81 MG/DL (ref 0–100)
MCH RBC QN AUTO: 33.3 PG (ref 26.8–34.3)
MCHC RBC AUTO-ENTMCNC: 34.7 G/DL (ref 31.4–37.4)
MCV RBC AUTO: 96 FL (ref 82–98)
NONHDLC SERPL-MCNC: 91 MG/DL
PLATELET # BLD AUTO: 189 THOUSANDS/UL (ref 149–390)
PMV BLD AUTO: 8.9 FL (ref 8.9–12.7)
RBC # BLD AUTO: 4.47 MILLION/UL (ref 3.88–5.62)
TESTOST SERPL-MSCNC: 155 NG/DL
TRIGL SERPL-MCNC: 48 MG/DL
WBC # BLD AUTO: 5.31 THOUSAND/UL (ref 4.31–10.16)

## 2024-11-05 PROCEDURE — 80061 LIPID PANEL: CPT

## 2024-11-05 PROCEDURE — 82306 VITAMIN D 25 HYDROXY: CPT

## 2024-11-05 PROCEDURE — 84403 ASSAY OF TOTAL TESTOSTERONE: CPT

## 2024-11-05 PROCEDURE — 85027 COMPLETE CBC AUTOMATED: CPT

## 2024-11-05 PROCEDURE — 36415 COLL VENOUS BLD VENIPUNCTURE: CPT

## 2024-11-07 DIAGNOSIS — E29.1 HYPOGONADISM IN MALE: ICD-10-CM

## 2024-11-07 RX ORDER — NEEDLES, SAFETY 18GX1 1/2"
NEEDLE, DISPOSABLE MISCELLANEOUS WEEKLY
Qty: 100 EACH | Refills: 1 | Status: SHIPPED | OUTPATIENT
Start: 2024-11-07

## 2024-11-07 NOTE — PROGRESS NOTES
11/8/2024      Chief Complaint   Patient presents with   • Follow-up         Assessment and Plan    54 y.o. male     1. Low testosterone   - Total testosterone (11/5/24) 155  - CBC within normal limits   - PSA (5/1/24) 1.35  - Testosterone and CBC in 6 weeks to check on dose change  -- PSA, Testosterone and CBC in 6 months. ADELAIDA at next visit  - Trial 1 mL testosterone injection weekly   - Call with any questions or concerns in the meantime  - All questions answered; patient understands and agrees with plan       History of Present Illness  Jose David Smalls is a 54 y.o. male patient with history of low testosterone here for follow up.      Patient seen in consultation for testosterone levels of 65 and 70. Workup with hormone levels at that time were normal. Patient was initiated on testosterone injections and has been doing well since. most recent testosterone 155. Currently using 0.75 mL injections weekly.  Symptoms are stable. CBC within normal levels and prostate cancer screening up to date.     Review of Systems   Constitutional:  Negative for activity change, appetite change, chills and fever.   HENT:  Negative for congestion and trouble swallowing.    Respiratory:  Negative for cough and shortness of breath.    Cardiovascular:  Negative for chest pain, palpitations and leg swelling.   Gastrointestinal:  Negative for abdominal pain, constipation, diarrhea, nausea and vomiting.   Genitourinary:  Negative for difficulty urinating, dysuria, flank pain, frequency, hematuria and urgency.   Musculoskeletal:  Negative for back pain and gait problem.   Skin:  Negative for wound.   Allergic/Immunologic: Negative for immunocompromised state.   Neurological:  Negative for dizziness and syncope.   Hematological:  Does not bruise/bleed easily.   Psychiatric/Behavioral:  Negative for confusion.    All other systems reviewed and are negative.      Vitals  Vitals:    11/08/24 1026   BP: 122/64   BP Location: Left arm   Patient  "Position: Sitting   Cuff Size: Large   Pulse: 64   Temp: 97.6 °F (36.4 °C)   TempSrc: Temporal   SpO2: 100%   Weight: 94.3 kg (208 lb)   Height: 5' 9\" (1.753 m)       Physical Exam  Constitutional:       General: He is not in acute distress.     Appearance: Normal appearance. He is not ill-appearing, toxic-appearing or diaphoretic.   HENT:      Head: Normocephalic.      Nose: No congestion.   Eyes:      General: No scleral icterus.        Right eye: No discharge.         Left eye: No discharge.      Conjunctiva/sclera: Conjunctivae normal.      Pupils: Pupils are equal, round, and reactive to light.   Pulmonary:      Effort: Pulmonary effort is normal.   Musculoskeletal:      Cervical back: Normal range of motion.   Skin:     General: Skin is warm and dry.      Coloration: Skin is not jaundiced or pale.      Findings: No bruising, erythema, lesion or rash.   Neurological:      General: No focal deficit present.      Mental Status: He is alert and oriented to person, place, and time. Mental status is at baseline.      Gait: Gait normal.   Psychiatric:         Mood and Affect: Mood normal.         Behavior: Behavior normal.         Thought Content: Thought content normal.         Judgment: Judgment normal.         Past History  Past Medical History:   Diagnosis Date   • Hypogonadism male      Social History     Socioeconomic History   • Marital status: /Civil Union     Spouse name: None   • Number of children: None   • Years of education: None   • Highest education level: None   Occupational History   • None   Tobacco Use   • Smoking status: Some Days     Types: Cigars     Passive exposure: Past   • Smokeless tobacco: Never   • Tobacco comments:     Occasionally a cigar smoker   Vaping Use   • Vaping status: Never Used   Substance and Sexual Activity   • Alcohol use: Yes     Alcohol/week: 3.0 standard drinks of alcohol     Types: 1 Glasses of wine, 1 Cans of beer, 1 Shots of liquor per week     Comment: Maybe a " drink every other week socially   • Drug use: Never   • Sexual activity: Yes     Partners: Female     Birth control/protection: None   Other Topics Concern   • None   Social History Narrative   • None     Social Determinants of Health     Financial Resource Strain: Low Risk  (12/8/2023)    Received from [facility], [facility]    Overall Financial Resource Strain (CARDIA)    • Difficulty of Paying Living Expenses: Not hard at all   Food Insecurity: No Food Insecurity (12/8/2023)    Received from [facility], [facility]    Hunger Vital Sign    • Worried About Running Out of Food in the Last Year: Never true    • Ran Out of Food in the Last Year: Never true   Transportation Needs: No Transportation Needs (12/8/2023)    Received from [facility], [facility]    PRAPARE - Transportation    • Lack of Transportation (Medical): No    • Lack of Transportation (Non-Medical): No   Physical Activity: Not on file   Stress: Not on file   Social Connections: Unknown (6/18/2024)    Received from "[employer] [employer]    Social Connections    • How often do you feel lonely or isolated from those around you? (Adult - for ages 18 years and over): Not on file   Intimate Partner Violence: Not At Risk (12/8/2023)    Received from [facility], [facility]    Humiliation, Afraid, Rape, and Kick questionnaire    • Fear of Current or Ex-Partner: No    • Emotionally Abused: No    • Physically Abused: No    • Sexually Abused: No   Housing Stability: Low Risk  (12/8/2023)    Received from [facility], [facility]    Housing Stability Vital Sign    • Unable to Pay for Housing in the Last Year: No    • Number of Places Lived in the Last Year: 1    • Unstable Housing in the Last Year: No     Social History     Tobacco Use   Smoking Status Some Days   • Types: Cigars   • Passive exposure:  Past   Smokeless Tobacco Never   Tobacco Comments    Occasionally a cigar smoker     Family History   Problem Relation Age of Onset   • Breast cancer Mother    • Cancer Mother    • Atrial fibrillation Father    • Stomach cancer Paternal Uncle        The following portions of the patient's history were reviewed and updated as appropriate: allergies, current medications, past medical history, past social history, past surgical history and problem list.    Results  No results found for this or any previous visit (from the past 1 hour(s)).]  Lab Results   Component Value Date    PSA 1.35 05/01/2024    PSA 1.2 05/03/2023    PSA 1.0 11/02/2022    PSA 1.3 06/06/2022     Lab Results   Component Value Date    CALCIUM 8.7 12/09/2023    K 3.9 12/09/2023    CO2 25 12/09/2023     12/09/2023    BUN 21 12/09/2023    CREATININE 0.99 12/09/2023     Lab Results   Component Value Date    WBC 5.31 11/05/2024    HGB 14.9 11/05/2024    HCT 43.0 11/05/2024    MCV 96 11/05/2024     11/05/2024       Xenia Perez PA-C

## 2024-11-07 NOTE — TELEPHONE ENCOUNTER
"Reason for call:   [x] Refill   [] Prior Auth  [] Other:     Office:   [] PCP/Provider -   [x] Specialty/Provider - URO    Medication:   SYRINGE-NEEDLE, DISP, 3 ML 20G X 1-1/2\" 3 ML MISC Does not apply, Every 7 days    BD ECLIPSE 25G X 1-1/2\" MISC Intramuscular, Weekly          Quantity:     Pharmacy: [id number] - [place] - [address] [phone]    Does the patient have enough for 3 days?   [x] Yes   [] No - Send as HP to POD    "

## 2024-11-08 ENCOUNTER — OFFICE VISIT (OUTPATIENT)
Dept: UROLOGY | Facility: CLINIC | Age: 54
End: 2024-11-08
Payer: COMMERCIAL

## 2024-11-08 VITALS
WEIGHT: 208 LBS | BODY MASS INDEX: 30.81 KG/M2 | OXYGEN SATURATION: 100 % | SYSTOLIC BLOOD PRESSURE: 122 MMHG | HEIGHT: 69 IN | HEART RATE: 64 BPM | DIASTOLIC BLOOD PRESSURE: 64 MMHG | TEMPERATURE: 97.6 F

## 2024-11-08 DIAGNOSIS — E29.1 HYPOGONADISM IN MALE: ICD-10-CM

## 2024-11-08 DIAGNOSIS — E55.9 VITAMIN D DEFICIENCY: ICD-10-CM

## 2024-11-08 PROCEDURE — 99213 OFFICE O/P EST LOW 20 MIN: CPT | Performed by: PHYSICIAN ASSISTANT

## 2024-11-08 RX ORDER — CELECOXIB 200 MG/1
1 CAPSULE ORAL DAILY
COMMUNITY
Start: 2024-10-15

## 2024-11-08 RX ORDER — TESTOSTERONE CYPIONATE 200 MG/ML
200 INJECTION, SOLUTION INTRAMUSCULAR WEEKLY
Qty: 4 ML | Refills: 3 | Status: SHIPPED | OUTPATIENT
Start: 2024-11-08

## 2024-11-08 RX ORDER — ERGOCALCIFEROL 1.25 MG/1
50000 CAPSULE, LIQUID FILLED ORAL WEEKLY
Qty: 12 CAPSULE | Refills: 0 | Status: SHIPPED | OUTPATIENT
Start: 2024-11-08

## 2025-01-02 ENCOUNTER — TELEPHONE (OUTPATIENT)
Age: 55
End: 2025-01-02

## 2025-01-02 DIAGNOSIS — E29.1 HYPOGONADISM IN MALE: ICD-10-CM

## 2025-01-02 RX ORDER — TESTOSTERONE CYPIONATE 200 MG/ML
200 INJECTION, SOLUTION INTRAMUSCULAR WEEKLY
Qty: 4 ML | Refills: 3 | Status: SHIPPED | OUTPATIENT
Start: 2025-01-02

## 2025-01-02 NOTE — TELEPHONE ENCOUNTER
Reason for call:   [x] Refill   [] Prior Auth  [x] Other: Pt never picked up previous script sent and states [facility] does not hold medication. His next dose is next Thursday, 1/5/25    Office:   [] PCP/Provider -   [x] Specialty/Provider - Uro/Xenia Perez     Medication: testosterone cypionate (DEPO-TESTOSTERONE) 200 mg/mL SOLN     Dose/Frequency: Inject 1 mL (200 mg total) into a muscle once a week     Quantity: 4 mL     Pharmacy: [facility] in [facility] [place]     Does the patient have enough for 3 days?   [x] Yes   [] No - Send as HP to POD

## 2025-01-02 NOTE — TELEPHONE ENCOUNTER
Pt called c/o hair loss and requesting an appointment. Pt offered and accepted an appointment tomorrow, Friday, 1/3/25 with PCP at 8:40 am.

## 2025-01-02 NOTE — TELEPHONE ENCOUNTER
Medication:  PDMP  12/02/2024 11/08/2024 Testosterone Cypionate (Oil) 4.0 28 200 MG/1 ML [facility] Commercial Insurance 0 / 3 [place]   1 [id number] 10/21/2024 10/21/2024 Testosterone Cypionate (Oil) 4.0 28 200 MG/1 ML [facility] Commercial Insurance 0 / 0 PA   1 [id number] 09/20/2024 09/20/2024 Testosterone Cypionate (Oil) 4.0 28 200 MG/1 ML [facility] Private Pay 0 / 0 [place]   1 [id number] 07/22/2024 06/13/2024 Testosterone Cypionate (Oil) 4.0 28 200 MG/1 ML [facility] Private Pay 1 / 5 [place]   1 [id number] 06/17/2024 06/13/2024 Testosterone Cypionate (Oil) 4.0 28 200 MG/1 ML [facility]  Refill must be reviewed and completed by the office or provider. The refill is unable to be approved or denied by the medication management team.

## 2025-01-03 ENCOUNTER — OFFICE VISIT (OUTPATIENT)
Dept: FAMILY MEDICINE CLINIC | Facility: CLINIC | Age: 55
End: 2025-01-03
Payer: COMMERCIAL

## 2025-01-03 VITALS
SYSTOLIC BLOOD PRESSURE: 124 MMHG | DIASTOLIC BLOOD PRESSURE: 82 MMHG | OXYGEN SATURATION: 91 % | TEMPERATURE: 97.5 F | BODY MASS INDEX: 31.87 KG/M2 | WEIGHT: 215.2 LBS | HEIGHT: 69 IN | HEART RATE: 67 BPM

## 2025-01-03 DIAGNOSIS — Z13.220 LIPID SCREENING: ICD-10-CM

## 2025-01-03 DIAGNOSIS — M25.551 RIGHT HIP PAIN: ICD-10-CM

## 2025-01-03 DIAGNOSIS — E55.9 VITAMIN D DEFICIENCY: ICD-10-CM

## 2025-01-03 DIAGNOSIS — L64.9 MALE PATTERN BALDNESS: Primary | ICD-10-CM

## 2025-01-03 PROCEDURE — 99214 OFFICE O/P EST MOD 30 MIN: CPT | Performed by: FAMILY MEDICINE

## 2025-01-03 RX ORDER — CELECOXIB 200 MG/1
200 CAPSULE ORAL DAILY PRN
Qty: 90 CAPSULE | Refills: 1 | Status: SHIPPED | OUTPATIENT
Start: 2025-01-03

## 2025-01-03 RX ORDER — ERGOCALCIFEROL 1.25 MG/1
50000 CAPSULE, LIQUID FILLED ORAL WEEKLY
Qty: 12 CAPSULE | Refills: 3 | Status: SHIPPED | OUTPATIENT
Start: 2025-01-03

## 2025-01-03 RX ORDER — FINASTERIDE 1 MG/1
1 TABLET, FILM COATED ORAL DAILY
Qty: 90 TABLET | Refills: 1 | Status: SHIPPED | OUTPATIENT
Start: 2025-01-03

## 2025-01-03 NOTE — PROGRESS NOTES
"Name: Jose David Smalls      : 1970      MRN: [id number]  Encounter Provider: Alejandra Lim MD  Encounter Date: 1/3/2025   Encounter department: [facility]  :  Assessment & Plan  Male pattern baldness  Will start Finasteride 1 mg   Advised topical minoxidil also  Discussed common side effects.  Discussed can take 6-12 months to be effective  Orders:  •  finasteride (PROPECIA) 1 MG tablet; Take 1 tablet (1 mg total) by mouth daily  •  Comprehensive metabolic panel; Future    Vitamin D deficiency  Continue Vitamin D, recheck levels with next labs  Orders:  •  ergocalciferol (VITAMIN D2) 50,000 units; Take 1 capsule (50,000 Units total) by mouth once a week  •  Comprehensive metabolic panel; Future  •  Vitamin D 25 hydroxy; Future    Right hip pain  Renewed Celebrex which he uses PRN, was prescribed by Ortho  Orders:  •  celecoxib (CeleBREX) 200 mg capsule; Take 1 capsule (200 mg total) by mouth daily as needed for mild pain  •  Comprehensive metabolic panel; Future    Lipid screening    Orders:  •  Lipid Panel with Direct LDL reflex; Future           History of Present Illness     He is here for hair loss, he has thinning of his hair in the back of his head, male pattern baldness. Has spoken to a few acquaintances about Finasteride and would like to try this. He is on Testosterone from Urology.    He also wants to know if he can have Celebrex, he takes this for hip pain.  He sees ortho and they prescribed this.   Is also asking for Vit D refills.  Taking weekly supplement. Vit D level did improve.       Review of Systems   Musculoskeletal:  Positive for arthralgias.   Skin:         Male pattern baldness       Objective   /82   Pulse 67   Temp 97.5 °F (36.4 °C)   Ht 5' 9\" (1.753 m)   Wt 97.6 kg (215 lb 3.2 oz)   SpO2 91%   BMI 31.78 kg/m²      Physical Exam  Vitals and nursing note reviewed.   Constitutional:       Appearance: He is well-developed.   HENT:      Head: " Normocephalic and atraumatic.   Pulmonary:      Effort: Pulmonary effort is normal. No respiratory distress.   Skin:         Neurological:      Mental Status: He is alert.   Psychiatric:         Behavior: Behavior normal.         Thought Content: Thought content normal.         Judgment: Judgment normal.

## 2025-01-03 NOTE — ASSESSMENT & PLAN NOTE
Continue Vitamin D, recheck levels with next labs  Orders:  •  ergocalciferol (VITAMIN D2) 50,000 units; Take 1 capsule (50,000 Units total) by mouth once a week  •  Comprehensive metabolic panel; Future  •  Vitamin D 25 hydroxy; Future

## 2025-01-21 ENCOUNTER — OFFICE VISIT (OUTPATIENT)
Dept: FAMILY MEDICINE CLINIC | Facility: CLINIC | Age: 55
End: 2025-01-21
Payer: COMMERCIAL

## 2025-01-21 VITALS
OXYGEN SATURATION: 98 % | DIASTOLIC BLOOD PRESSURE: 78 MMHG | WEIGHT: 210 LBS | HEART RATE: 60 BPM | TEMPERATURE: 98.1 F | SYSTOLIC BLOOD PRESSURE: 112 MMHG | HEIGHT: 69 IN | BODY MASS INDEX: 31.1 KG/M2

## 2025-01-21 DIAGNOSIS — R05.9 COUGH, UNSPECIFIED TYPE: ICD-10-CM

## 2025-01-21 DIAGNOSIS — J06.9 VIRAL URI WITH COUGH: Primary | ICD-10-CM

## 2025-01-21 LAB
SARS-COV-2 AG UPPER RESP QL IA: NEGATIVE
SL AMB POCT RAPID FLU A: NEGATIVE
SL AMB POCT RAPID FLU B: NEGATIVE
VALID CONTROL: NORMAL

## 2025-01-21 PROCEDURE — 99213 OFFICE O/P EST LOW 20 MIN: CPT

## 2025-01-21 PROCEDURE — 87811 SARS-COV-2 COVID19 W/OPTIC: CPT

## 2025-01-21 PROCEDURE — 87804 INFLUENZA ASSAY W/OPTIC: CPT

## 2025-01-21 PROCEDURE — 87636 SARSCOV2 & INF A&B AMP PRB: CPT

## 2025-01-21 RX ORDER — BROMPHENIRAMINE MALEATE, PSEUDOEPHEDRINE HYDROCHLORIDE, AND DEXTROMETHORPHAN HYDROBROMIDE 2; 30; 10 MG/5ML; MG/5ML; MG/5ML
5 SYRUP ORAL 4 TIMES DAILY PRN
Qty: 120 ML | Refills: 0 | Status: SHIPPED | OUTPATIENT
Start: 2025-01-21

## 2025-01-21 NOTE — PROGRESS NOTES
"Name: Jose David Smalls      : 1970      MRN: [id number]  Encounter Provider: Cari Cates PA-C  Encounter Date: 2025   Encounter department: [facility]  :  Assessment & Plan  Viral URI with cough  Patient presents for evaluation of cough, congestion, body aches, and fatigue x 1 day   Rapid flu and covid in office negative  We did discuss with only 24 hours of symptoms, a PCR test may be more accurate -- therefore we sent confirmatory PCR for flu/covid   Physical exam reveals nasal congestion and posterior pharyngeal erythema but otherwise unremarkable; lungs are clear with O2 98% on room air and pulse of 60 which is reassuring   Discussed based on timeline of symptoms and reassuring exam findings that this is likely a viral illness   Recommended supportive care, increase hydration, and rest  Prescribed Bromphed to take as needed for cough/congestion, discussed side effects  Advised immediate ER for worsening symptoms or if he develop any chest pain or shortness of breath -- pt agreeable  He is to call with any questions or concerns    Orders:    brompheniramine-pseudoephedrine-DM 30-2-10 MG/5ML syrup; Take 5 mL by mouth 4 (four) times a day as needed for cough or congestion    Cough, unspecified type    Orders:    POCT rapid flu A and B    POCT Rapid Covid Ag    Covid/Flu- Office Collect Normal; Future           History of Present Illness   CC: cough, body aches, fatigue, congestion x 1 day     Patient presents for evaluation of cough, body aches, fatigue, congestion x 1 day. Reports symptoms started yesterday. Cough is productive. Denies chest pain or shortness of breath, does admit feels \"more winded\" on exertion but no trouble breathing   Has been taking vitamin C, zinc, and green tea which has been helping  Staying hydrated. Denies fevers.  No sore throat or trouble swallowing   Denies GI symptoms. Normal urinary frequency.   No known sick contacts.       Review of Systems " "  Constitutional:  Positive for fatigue. Negative for chills, diaphoresis and fever.   HENT:  Positive for congestion and rhinorrhea. Negative for ear pain, sinus pressure, sinus pain, sore throat and trouble swallowing.    Respiratory:  Positive for cough. Negative for chest tightness and shortness of breath.    Cardiovascular:  Negative for chest pain, palpitations and leg swelling.   Gastrointestinal:  Negative for abdominal pain, constipation, diarrhea, nausea and vomiting.   Genitourinary:  Negative for decreased urine volume and difficulty urinating.   Musculoskeletal:  Positive for myalgias.   Neurological:  Positive for headaches. Negative for dizziness, weakness and light-headedness.       Objective   /78   Pulse 60   Temp 98.1 °F (36.7 °C)   Ht 5' 9\" (1.753 m)   Wt 95.3 kg (210 lb)   SpO2 98%   BMI 31.01 kg/m²      Physical Exam  Vitals reviewed.   Constitutional:       General: He is not in acute distress.     Appearance: Normal appearance. He is not ill-appearing or diaphoretic.   HENT:      Head: Normocephalic and atraumatic.      Right Ear: Tympanic membrane, ear canal and external ear normal. There is no impacted cerumen.      Left Ear: Tympanic membrane, ear canal and external ear normal. There is no impacted cerumen.      Nose: Congestion present. No rhinorrhea.      Mouth/Throat:      Mouth: Mucous membranes are moist.      Pharynx: Oropharynx is clear. Posterior oropharyngeal erythema present. No oropharyngeal exudate.   Eyes:      General:         Right eye: No discharge.         Left eye: No discharge.      Conjunctiva/sclera: Conjunctivae normal.   Cardiovascular:      Rate and Rhythm: Normal rate and regular rhythm.      Pulses: Normal pulses.      Heart sounds: Normal heart sounds. No murmur heard.  Pulmonary:      Effort: Pulmonary effort is normal. No respiratory distress.      Breath sounds: Normal breath sounds. No wheezing, rhonchi or rales.   Musculoskeletal:         " General: Normal range of motion.      Cervical back: Normal range of motion and neck supple.      Right lower leg: No edema.      Left lower leg: No edema.   Lymphadenopathy:      Cervical: No cervical adenopathy.   Skin:     General: Skin is warm.   Neurological:      General: No focal deficit present.      Mental Status: He is alert.      Gait: Gait normal.   Psychiatric:         Mood and Affect: Mood normal.

## 2025-01-22 ENCOUNTER — RESULTS FOLLOW-UP (OUTPATIENT)
Dept: FAMILY MEDICINE CLINIC | Facility: CLINIC | Age: 55
End: 2025-01-22

## 2025-01-22 LAB
FLUAV RNA RESP QL NAA+PROBE: POSITIVE
FLUBV RNA RESP QL NAA+PROBE: NEGATIVE
SARS-COV-2 RNA RESP QL NAA+PROBE: NEGATIVE

## 2025-01-23 DIAGNOSIS — E29.1 HYPOGONADISM IN MALE: ICD-10-CM

## 2025-01-23 RX ORDER — NEEDLES, SAFETY 18GX1 1/2"
NEEDLE, DISPOSABLE MISCELLANEOUS WEEKLY
Qty: 100 EACH | Refills: 1 | Status: SHIPPED | OUTPATIENT
Start: 2025-01-23

## 2025-01-23 NOTE — TELEPHONE ENCOUNTER
"Reason for call:   [x] Refill   [] Prior Auth  [] Other:     Office:   [] PCP/Provider -   [x] Specialty/Provider - Urology    Medication:     SYRINGE-NEEDLE, DISP, 3 ML 20G X 1-1/2\" 3 ML MISC       BD ECLIPSE 25G X 1-1/2\" MISC       Dose/Frequency:     Does not apply, Every 7 days       Intramuscular, Weekly       Quantity:   30  100    Pharmacy: [facility] #[id number] - [place] - [address]     Does the patient have enough for 3 days?   [x] Yes   [] No - Send as HP to POD    "

## 2025-03-03 DIAGNOSIS — L64.9 MALE PATTERN BALDNESS: ICD-10-CM

## 2025-03-03 NOTE — TELEPHONE ENCOUNTER
Reason for call:   [x] Refill   [] Prior Auth  [x] Other: Pt called for a rx refill for his finasteride, but her already had a refill on his rx. [facility]. Pt verbalized understanding.

## 2025-04-02 DIAGNOSIS — L64.9 MALE PATTERN BALDNESS: ICD-10-CM

## 2025-04-02 RX ORDER — FINASTERIDE 1 MG/1
1 TABLET, FILM COATED ORAL DAILY
Qty: 90 TABLET | Refills: 1 | OUTPATIENT
Start: 2025-04-02

## 2025-04-02 NOTE — TELEPHONE ENCOUNTER
Reason for call:   [x] Refill   [] Prior Auth  [] Other:     Office:   [x] PCP/Provider - Alejandra SHIN    [] Specialty/Provider -     Medication: Finasteride    Dose/Frequency: 1 mg     Quantity: #90    Pharmacy: [facility] [address]     Local Pharmacy   Does the patient have enough for 3 days?   [] Yes   [x] No - Send as HP to POD    Mail Away Pharmacy   Does the patient have enough for 10 days?   [] Yes   [] No - Send as HP to POD

## 2025-04-17 DIAGNOSIS — E29.1 HYPOGONADISM IN MALE: ICD-10-CM

## 2025-04-17 RX ORDER — NEEDLES, SAFETY 18GX1 1/2"
NEEDLE, DISPOSABLE MISCELLANEOUS WEEKLY
Qty: 12 EACH | Refills: 5 | Status: SHIPPED | OUTPATIENT
Start: 2025-04-17

## 2025-04-17 NOTE — TELEPHONE ENCOUNTER
"Reason for call:   [x] Refill   [] Prior Auth  [] Other:     Office:   [] PCP/Provider -   [x] Specialty/Provider - [facility] [place]     Medication: SYRINGE-NEEDLE, DISP, 3 ML 20G X 1-1/2\" 3 ML MISC Every 7 days    BD ECLIPSE 25G X 1-1/2\" MISC Intramuscular, Weekly      Quantity: 12    Pharmacy: [id number] - [place] - [address] [phone]    Local Pharmacy   Does the patient have enough for 3 days?   [x] Yes   [] No - Send as HP to POD      "

## 2025-05-01 ENCOUNTER — TELEPHONE (OUTPATIENT)
Dept: UROLOGY | Facility: CLINIC | Age: 55
End: 2025-05-01

## 2025-05-01 NOTE — TELEPHONE ENCOUNTER
Spoke with providing a friendly reminder they have an upcoming appt to go over blood work. Informed pt they can get this done at any  lab. Pt verbalized understanding. No further assistance

## 2025-05-06 ENCOUNTER — APPOINTMENT (OUTPATIENT)
Age: 55
End: 2025-05-06
Payer: COMMERCIAL

## 2025-05-06 DIAGNOSIS — E29.1 HYPOGONADISM IN MALE: ICD-10-CM

## 2025-05-06 LAB
ERYTHROCYTE [DISTWIDTH] IN BLOOD BY AUTOMATED COUNT: 12.4 % (ref 11.6–15.1)
HCT VFR BLD AUTO: 45.3 % (ref 36.5–49.3)
HGB BLD-MCNC: 15.8 G/DL (ref 12–17)
MCH RBC QN AUTO: 33.4 PG (ref 26.8–34.3)
MCHC RBC AUTO-ENTMCNC: 34.9 G/DL (ref 31.4–37.4)
MCV RBC AUTO: 96 FL (ref 82–98)
PLATELET # BLD AUTO: 218 THOUSANDS/UL (ref 149–390)
PMV BLD AUTO: 9.5 FL (ref 8.9–12.7)
PSA SERPL-MCNC: 0.47 NG/ML (ref 0–4)
RBC # BLD AUTO: 4.73 MILLION/UL (ref 3.88–5.62)
TESTOST SERPL-MSCNC: 835 NG/DL (ref 175–781)
WBC # BLD AUTO: 5.86 THOUSAND/UL (ref 4.31–10.16)

## 2025-05-06 PROCEDURE — 84403 ASSAY OF TOTAL TESTOSTERONE: CPT

## 2025-05-06 PROCEDURE — 84153 ASSAY OF PSA TOTAL: CPT

## 2025-05-06 PROCEDURE — 36415 COLL VENOUS BLD VENIPUNCTURE: CPT

## 2025-05-06 PROCEDURE — 85027 COMPLETE CBC AUTOMATED: CPT

## 2025-05-09 ENCOUNTER — OFFICE VISIT (OUTPATIENT)
Dept: UROLOGY | Facility: CLINIC | Age: 55
End: 2025-05-09
Payer: COMMERCIAL

## 2025-05-09 VITALS
TEMPERATURE: 97.6 F | HEIGHT: 69 IN | BODY MASS INDEX: 30.96 KG/M2 | SYSTOLIC BLOOD PRESSURE: 140 MMHG | DIASTOLIC BLOOD PRESSURE: 82 MMHG | OXYGEN SATURATION: 100 % | HEART RATE: 85 BPM | WEIGHT: 209 LBS

## 2025-05-09 DIAGNOSIS — R79.89 LOW TESTOSTERONE: Primary | ICD-10-CM

## 2025-05-09 DIAGNOSIS — E29.1 HYPOGONADISM IN MALE: ICD-10-CM

## 2025-05-09 PROCEDURE — 99213 OFFICE O/P EST LOW 20 MIN: CPT | Performed by: PHYSICIAN ASSISTANT

## 2025-05-09 RX ORDER — TESTOSTERONE CYPIONATE 200 MG/ML
200 INJECTION, SOLUTION INTRAMUSCULAR WEEKLY
Qty: 4 ML | Refills: 3 | Status: SHIPPED | OUTPATIENT
Start: 2025-05-09

## 2025-05-09 NOTE — PROGRESS NOTES
Name: Jose David Smalls      : 1970      MRN: [id number]  Encounter Provider: Xenia Perez PA-C  Encounter Date: 2025   Encounter department: [facility] [place]  :  Assessment & Plan  Low testosterone  Total testosterone (25) 800  CBC within normal levels  PSA 0.4  Doing well  Will continue 1 mL testosterone injection weekly   Follow up with PCP for ongoing fatigue. Discussed sleep medicine, patient defers at this time  - 6 months labs, ADELAIDA at next visit  Orders:    Testosterone; Future    CBC and Platelet; Future    Hypogonadism in male    Orders:    testosterone cypionate (DEPO-TESTOSTERONE) 200 mg/mL SOLN; Inject 1 mL (200 mg total) into a muscle once a week        History of Present Illness   Jose David Smalls is a 55 y.o. male who presents for follow up.      Patient seen in consultation for testosterone levels of 65 and 70. Workup with hormone levels at that time were normal. Patient was initiated on testosterone injections and has been doing well since. most recent testosterone 800. Doing well with 1 mL weekly. Symptoms are stable. CBC within normal levels and prostate cancer screening up to date. Some ongoing fatigue    Review of Systems   Constitutional:  Negative for activity change, appetite change, chills and fever.   HENT:  Negative for congestion and trouble swallowing.    Respiratory:  Negative for cough and shortness of breath.    Cardiovascular:  Negative for chest pain, palpitations and leg swelling.   Gastrointestinal:  Negative for abdominal pain, constipation, diarrhea, nausea and vomiting.   Genitourinary:  Negative for difficulty urinating, dysuria, flank pain, frequency, hematuria and urgency.   Musculoskeletal:  Negative for back pain and gait problem.   Skin:  Negative for wound.   Allergic/Immunologic: Negative for immunocompromised state.   Neurological:  Negative for dizziness and syncope.   Hematological:  Does not bruise/bleed easily.  "  Psychiatric/Behavioral:  Negative for confusion.    All other systems reviewed and are negative.         Objective   /82 (BP Location: Left arm, Patient Position: Sitting, Cuff Size: Standard)   Pulse 85   Temp 97.6 °F (36.4 °C)   Ht 5' 9\" (1.753 m)   Wt 94.8 kg (209 lb)   SpO2 100%   BMI 30.86 kg/m²     Physical Exam  Constitutional:       Appearance: Normal appearance.   HENT:      Head: Normocephalic.      Nose: Nose normal.      Mouth/Throat:      Pharynx: Oropharynx is clear.   Eyes:      Extraocular Movements: Extraocular movements intact.      Pupils: Pupils are equal, round, and reactive to light.   Pulmonary:      Effort: Pulmonary effort is normal.   Musculoskeletal:         General: Normal range of motion.      Cervical back: Normal range of motion.   Skin:     General: Skin is warm.   Neurological:      General: No focal deficit present.      Mental Status: He is alert and oriented to person, place, and time. Mental status is at baseline.   Psychiatric:         Mood and Affect: Mood normal.         Behavior: Behavior normal.         Thought Content: Thought content normal.         Judgment: Judgment normal.          Results   Lab Results   Component Value Date    PSA 0.471 05/06/2025    PSA 1.35 05/01/2024    PSA 1.2 05/03/2023     Lab Results   Component Value Date    CALCIUM 8.7 12/09/2023    K 3.9 12/09/2023    CO2 25 12/09/2023     12/09/2023    BUN 21 12/09/2023    CREATININE 0.99 12/09/2023     Lab Results   Component Value Date    WBC 5.86 05/06/2025    HGB 15.8 05/06/2025    HCT 45.3 05/06/2025    MCV 96 05/06/2025     05/06/2025       Office Urine Dip  No results found for this or any previous visit (from the past hour).      "

## 2025-05-20 ENCOUNTER — OFFICE VISIT (OUTPATIENT)
Dept: FAMILY MEDICINE CLINIC | Facility: CLINIC | Age: 55
End: 2025-05-20
Payer: COMMERCIAL

## 2025-05-20 ENCOUNTER — APPOINTMENT (OUTPATIENT)
Dept: RADIOLOGY | Facility: CLINIC | Age: 55
End: 2025-05-20
Payer: COMMERCIAL

## 2025-05-20 ENCOUNTER — RESULTS FOLLOW-UP (OUTPATIENT)
Dept: FAMILY MEDICINE CLINIC | Facility: CLINIC | Age: 55
End: 2025-05-20

## 2025-05-20 VITALS
WEIGHT: 210.8 LBS | BODY MASS INDEX: 31.22 KG/M2 | HEART RATE: 67 BPM | TEMPERATURE: 96.8 F | HEIGHT: 69 IN | SYSTOLIC BLOOD PRESSURE: 122 MMHG | OXYGEN SATURATION: 98 % | DIASTOLIC BLOOD PRESSURE: 84 MMHG

## 2025-05-20 DIAGNOSIS — M79.672 PAIN OF LEFT HEEL: ICD-10-CM

## 2025-05-20 DIAGNOSIS — N63.10 MASS OF RIGHT BREAST, UNSPECIFIED QUADRANT: Primary | ICD-10-CM

## 2025-05-20 DIAGNOSIS — Z80.3 FAMILY HISTORY OF BREAST CANCER: ICD-10-CM

## 2025-05-20 PROCEDURE — 99213 OFFICE O/P EST LOW 20 MIN: CPT

## 2025-05-20 PROCEDURE — 73630 X-RAY EXAM OF FOOT: CPT

## 2025-05-20 NOTE — PROGRESS NOTES
"Name: Jose David Smalls      : 1970      MRN: [id number]  Encounter Provider: Cari Cates PA-C  Encounter Date: 2025   Encounter department: [facility]  :  Assessment & Plan  Mass of right breast, unspecified quadrant  Palpable lump, outer lower quadrant of right breast  Significant family hx of breast cancer  Will check diagnostic mammogram and US for further evaluation and follow up with results  Pt to call with any questions/concerns    Orders:    US breast right limited (diagnostic); Future    Mammo diagnostic right w 3d and cad; Future    Family history of breast cancer    Orders:    US breast right limited (diagnostic); Future    Mammo diagnostic right w 3d and cad; Future    Pain of left heel  Ongoing left heel pain after exercise  Concern for possible heel spur  Will check XR for further evaluation  Recommended orthotic shoe inserts and supportive care    Orders:    XR foot 3+ vw left; Future           History of Present Illness   CC: mass of right breast, left heel pain     Patient presents for evaluation of a lump of his right breast  Pt has significant family hx of breast cancer in first degree relative. In  had a lump in same breast, had mammogram/US which was consistent with gynecomastia. However, about three months ago noticed a new lump in the area. Painful if he touches it. Has not grown in size. No skin changes.     Pt reports he has also been having a lot of left heel pain in his foot after exercise for the last few months. Thinks he has a heel spur, would like an XR.       Review of Systems   Respiratory:  Negative for chest tightness, shortness of breath and wheezing.    Cardiovascular:  Negative for chest pain and palpitations.   Musculoskeletal:  Positive for arthralgias.   Neurological:  Negative for dizziness, light-headedness and headaches.       Objective   /84   Pulse 67   Temp (!) 96.8 °F (36 °C)   Ht 5' 9\" (1.753 m)   Wt 95.6 kg (210 lb " 12.8 oz)   SpO2 98%   BMI 31.13 kg/m²      Physical Exam  Vitals reviewed.   Constitutional:       General: He is not in acute distress.     Appearance: Normal appearance. He is not ill-appearing or diaphoretic.   HENT:      Head: Normocephalic and atraumatic.      Right Ear: External ear normal.      Left Ear: External ear normal.      Nose: Nose normal.      Mouth/Throat:      Mouth: Mucous membranes are moist.     Eyes:      Conjunctiva/sclera: Conjunctivae normal.       Cardiovascular:      Rate and Rhythm: Normal rate and regular rhythm.      Heart sounds: Normal heart sounds. No murmur heard.  Pulmonary:      Effort: Pulmonary effort is normal. No respiratory distress.      Breath sounds: Normal breath sounds. No wheezing, rhonchi or rales.   Chest:   Breasts:     Right: Mass present.       Skin:     General: Skin is warm.     Neurological:      General: No focal deficit present.      Mental Status: He is alert.      Gait: Gait normal.     Psychiatric:         Mood and Affect: Mood normal.

## 2025-06-17 ENCOUNTER — TELEPHONE (OUTPATIENT)
Dept: MAMMOGRAPHY | Facility: CLINIC | Age: 55
End: 2025-06-17

## 2025-06-18 ENCOUNTER — HOSPITAL ENCOUNTER (OUTPATIENT)
Dept: ULTRASOUND IMAGING | Facility: CLINIC | Age: 55
Discharge: HOME | End: 2025-06-18
Payer: COMMERCIAL

## 2025-06-18 ENCOUNTER — HOSPITAL ENCOUNTER (OUTPATIENT)
Dept: MAMMOGRAPHY | Facility: CLINIC | Age: 55
Discharge: HOME | End: 2025-06-18
Payer: COMMERCIAL

## 2025-06-18 DIAGNOSIS — N63.10 MASS OF RIGHT BREAST, UNSPECIFIED QUADRANT: ICD-10-CM

## 2025-06-18 DIAGNOSIS — Z80.3 FAMILY HISTORY OF BREAST CANCER: ICD-10-CM

## 2025-06-18 PROCEDURE — G0279 TOMOSYNTHESIS, MAMMO: HCPCS

## 2025-06-18 PROCEDURE — 76642 ULTRASOUND BREAST LIMITED: CPT

## 2025-06-18 PROCEDURE — 77066 DX MAMMO INCL CAD BI: CPT

## 2025-06-28 DIAGNOSIS — L64.9 MALE PATTERN BALDNESS: ICD-10-CM

## 2025-06-28 DIAGNOSIS — M25.551 RIGHT HIP PAIN: ICD-10-CM

## 2025-06-30 RX ORDER — CELECOXIB 200 MG/1
200 CAPSULE ORAL DAILY PRN
Qty: 90 CAPSULE | Refills: 1 | Status: SHIPPED | OUTPATIENT
Start: 2025-06-30

## 2025-06-30 RX ORDER — FINASTERIDE 1 MG/1
1 TABLET, FILM COATED ORAL DAILY
Qty: 90 TABLET | Refills: 1 | Status: SHIPPED | OUTPATIENT
Start: 2025-06-30

## 2025-07-03 NOTE — TELEPHONE ENCOUNTER
Patient called requesting refill for finasteride. Patient made aware medication was refilled on 6/30/25 for 90 with 1 refills to [facility] pharmacy. Patient instructed to contact the pharmacy and speak with someone directly to obtain refills of medication. Patient advised to call back for refill if their pharmacy is unable to assist them. Patient verbalized understanding.